# Patient Record
Sex: FEMALE | Race: WHITE | Employment: FULL TIME | URBAN - METROPOLITAN AREA
[De-identification: names, ages, dates, MRNs, and addresses within clinical notes are randomized per-mention and may not be internally consistent; named-entity substitution may affect disease eponyms.]

---

## 2017-03-30 RX ORDER — LISINOPRIL 20 MG/1
TABLET ORAL
Qty: 90 TAB | Refills: 3 | Status: SHIPPED | OUTPATIENT
Start: 2017-03-30 | End: 2019-01-31 | Stop reason: SINTOL

## 2017-03-30 RX ORDER — LISINOPRIL 20 MG/1
20 TABLET ORAL DAILY
Qty: 90 TAB | Refills: 2 | Status: SHIPPED | OUTPATIENT
Start: 2017-03-30 | End: 2018-04-10 | Stop reason: SDUPTHER

## 2017-03-30 RX ORDER — PRAVASTATIN SODIUM 10 MG/1
TABLET ORAL
Qty: 90 TAB | Refills: 3 | Status: SHIPPED | OUTPATIENT
Start: 2017-03-30 | End: 2018-04-10 | Stop reason: SDUPTHER

## 2017-03-30 NOTE — TELEPHONE ENCOUNTER
Requested Prescriptions     Pending Prescriptions Disp Refills    lisinopril (PRINIVIL, ZESTRIL) 20 mg tablet 90 Tab 1    pravastatin (PRAVACHOL) 10 mg tablet 90 Tab 1   Last OV-1/12/15  Next OV-N/S  Med refilled-12/25/16

## 2017-03-30 NOTE — TELEPHONE ENCOUNTER
Requested Prescriptions     Pending Prescriptions Disp Refills    lisinopril (PRINIVIL, ZESTRIL) 20 mg tablet 90 Tab 2     Sig: Take 1 Tab by mouth daily.    Last OV-1/12/15  Next OV-N/S  Med refilled-12/25/16

## 2017-08-30 ENCOUNTER — TELEPHONE (OUTPATIENT)
Dept: INTERNAL MEDICINE CLINIC | Age: 57
End: 2017-08-30

## 2017-08-30 NOTE — TELEPHONE ENCOUNTER
Dr. Grover Hammans Patient last seen Jan. 12, 2015 states she needs a call back to discuss Left knee pain & aching that is usually when she does any weight bearing movement. Patient states she needs to know if needs to be seen by Dr. Grover Hammans first or if he can refer to specialist. Please call to advise.  Thank you

## 2017-08-30 NOTE — TELEPHONE ENCOUNTER
Called, spoke to pt. Two pt identifiers confirmed. Pt informed Dr. Reinaldo Samson will be notified. Pt verbalized understanding of information discussed w/ no further questions at this time.

## 2017-08-30 NOTE — TELEPHONE ENCOUNTER
MD Nicholas Stanley, LPN        Caller: Unspecified (Today,  8:39 AM)                     [facility]

## 2017-08-30 NOTE — TELEPHONE ENCOUNTER
Called, spoke to pt. Two pt identifiers confirmed. Pt referred to Dr. Warden Sparrow. Given contact info. Pt verbalized understanding of information discussed w/ no further questions at this time.

## 2018-04-06 ENCOUNTER — TELEPHONE (OUTPATIENT)
Dept: INTERNAL MEDICINE CLINIC | Age: 58
End: 2018-04-06

## 2018-04-06 NOTE — TELEPHONE ENCOUNTER
Today our office received a refill request from [address] home delivery for refill on prinivil 20 mg and [id number] mg. Pt has seen in office by Dr Percy Weller on 1/12/15. I called pt, no answer- left voice message that she is overdue for follow up appointment and to return call to office to schedule an appointment. Message will be routed to Dr Percy Weller as an [id number] [address].

## 2018-04-09 NOTE — TELEPHONE ENCOUNTER
Pt wants to come in on 04/17/18 but no appt was avail.  Best contact number [phone].          Message received & copied from [facility] after closing on 4/6/18

## 2018-04-10 RX ORDER — LISINOPRIL 20 MG/1
20 TABLET ORAL DAILY
Qty: 90 TAB | Refills: 0 | Status: SHIPPED | OUTPATIENT
Start: 2018-04-10 | End: 2018-04-11 | Stop reason: SDUPTHER

## 2018-04-10 RX ORDER — PRAVASTATIN SODIUM 10 MG/1
TABLET ORAL
Qty: 90 TAB | Refills: 0 | Status: SHIPPED | OUTPATIENT
Start: 2018-04-10 | End: 2018-04-11 | Stop reason: SDUPTHER

## 2018-04-10 NOTE — TELEPHONE ENCOUNTER
Patient states she's returning your call. Patient is requesting to get an appt to [facility] with Dr. Laguna Handing on tomorrow, 4/11/18. Patient last seen January 12, 2015 & needs medicatio  Refills done. Please call to advise.  Thank you

## 2018-04-10 NOTE — TELEPHONE ENCOUNTER
Spoke with patient. Two pt identifiers confirmed. Patient states that she is trying to get an appointment with Dr. Will Fournier sooner than August.  Advised patient that due to the length of time that has passed since her last OV, she would be considered a NP. Advised patient that the first appointment available for a NP is not until August.  Offered to schedule patient with another provider. Patient declined, states that she only wants to see Dr. Will Fournier. Patient states that she is ok with the August appointment but that she needs refills on her prescriptions to last until she can be seen. Advised patient that I will pend her refills to Dr. Will Fournier for approval and will let him know about the situation with her appointment. Pt verbalized understanding of information discussed w/ no further questions at this time.

## 2018-04-11 DIAGNOSIS — I10 ESSENTIAL HYPERTENSION: Primary | ICD-10-CM

## 2018-04-11 DIAGNOSIS — E78.5 DYSLIPIDEMIA: Primary | ICD-10-CM

## 2018-04-11 RX ORDER — PRAVASTATIN SODIUM 10 MG/1
TABLET ORAL
Qty: 90 TAB | Refills: 0 | Status: SHIPPED | OUTPATIENT
Start: 2018-04-11 | End: 2018-07-10 | Stop reason: SDUPTHER

## 2018-04-11 RX ORDER — LISINOPRIL 20 MG/1
20 TABLET ORAL DAILY
Qty: 90 TAB | Refills: 0 | Status: SHIPPED | OUTPATIENT
Start: 2018-04-11 | End: 2018-10-19 | Stop reason: SDUPTHER

## 2018-04-11 NOTE — TELEPHONE ENCOUNTER
Requested Prescriptions     Pending Prescriptions Disp Refills    lisinopril (PRINIVIL, ZESTRIL) 20 mg tablet 90 Tab 0     Sig: Take 1 Tab by mouth daily. PCP: Queta Barros MD    Last appt: Visit date not found  Future Appointments  Date Time Provider Héctor Lagunas   8/3/2018 11:00 AM Queta Barros MD [address]       Requested Prescriptions     Pending Prescriptions Disp Refills    lisinopril (PRINIVIL, ZESTRIL) 20 mg tablet 90 Tab 0     Sig: Take 1 Tab by mouth daily.

## 2018-04-11 NOTE — TELEPHONE ENCOUNTER
Requested Prescriptions     Pending Prescriptions Disp Refills    pravastatin (PRAVACHOL) 10 mg tablet 90 Tab 0     Sig: TAKE 1 TABLET NIGHTLY     PCP: Samanta Peterson MD    Last appt: Visit date not found  Future Appointments  Date Time Provider Héctor Janneth   8/3/2018 11:00 AM [facility]       Requested Prescriptions     Pending Prescriptions Disp Refills    pravastatin (PRAVACHOL) 10 mg tablet 90 Tab 0     Sig: TAKE 1 TABLET NIGHTLY

## 2018-07-10 DIAGNOSIS — E78.5 DYSLIPIDEMIA: ICD-10-CM

## 2018-07-10 RX ORDER — LISINOPRIL 20 MG/1
TABLET ORAL
Qty: 90 TAB | Refills: 3 | Status: SHIPPED | OUTPATIENT
Start: 2018-07-10 | End: 2018-10-19 | Stop reason: SDUPTHER

## 2018-07-10 RX ORDER — PRAVASTATIN SODIUM 10 MG/1
TABLET ORAL
Qty: 90 TAB | Refills: 3 | Status: SHIPPED | OUTPATIENT
Start: 2018-07-10 | End: 2018-11-26

## 2018-10-19 ENCOUNTER — OFFICE VISIT (OUTPATIENT)
Dept: INTERNAL MEDICINE CLINIC | Age: 58
End: 2018-10-19

## 2018-10-19 VITALS
BODY MASS INDEX: 22.38 KG/M2 | HEIGHT: 60 IN | SYSTOLIC BLOOD PRESSURE: 117 MMHG | WEIGHT: 114 LBS | HEART RATE: 102 BPM | TEMPERATURE: 98.2 F | DIASTOLIC BLOOD PRESSURE: 74 MMHG | OXYGEN SATURATION: 100 %

## 2018-10-19 DIAGNOSIS — Q38.3 TONGUE ABNORMALITY: ICD-10-CM

## 2018-10-19 DIAGNOSIS — J01.91 ACUTE RECURRENT SINUSITIS, UNSPECIFIED LOCATION: ICD-10-CM

## 2018-10-19 DIAGNOSIS — Z11.59 ENCOUNTER FOR HEPATITIS C SCREENING TEST FOR LOW RISK PATIENT: ICD-10-CM

## 2018-10-19 DIAGNOSIS — E78.00 PURE HYPERCHOLESTEROLEMIA: ICD-10-CM

## 2018-10-19 DIAGNOSIS — I10 ESSENTIAL HYPERTENSION: Primary | ICD-10-CM

## 2018-10-19 PROBLEM — F17.210 CIGARETTE SMOKER: Status: ACTIVE | Noted: 2018-10-19

## 2018-10-19 RX ORDER — AZITHROMYCIN 250 MG/1
250 TABLET, FILM COATED ORAL SEE ADMIN INSTRUCTIONS
Qty: 6 TAB | Refills: 0 | Status: SHIPPED | OUTPATIENT
Start: 2018-10-19 | End: 2018-10-24

## 2018-10-19 NOTE — PROGRESS NOTES
HISTORY OF PRESENT ILLNESS Geovanna Cheng is a 62 y.o. female. HPI Pt here to reestablish care Hx HTN HLD osteoporosis Had recent labs at work   HDL 38 LDl 151 tg 85 Smokes 1 pack every 3-4 d Increased sinus congestion and cough 4 weeks and again now No fc, wheezes Had colonsocopy 2015--no polyps--FH polyps Sees gyn MD yearly-Dr Andria Pedraza. dexa per Dr Andria Pedraza Last OV ( 2015) Patient here today for followup for hypertension and hyperlipidemia. She had recent admission screening tests done at work. Cholesterol was 233  glucose 80. Blood pressure reading was normal. She generally eats very healthy diet. She stays active but does not really exercise on a regular basis. She resumed smoking less than half pack per day. She cannot tolerate Chantix. Patient Active Problem List  
 Diagnosis Date Noted  Osteoporosis 07/25/2016  Essential hypertension, benign 03/24/2010  Pure hypercholesterolemia 03/24/2010  Kidney stones 03/24/2010 Current Outpatient Medications Medication Sig Dispense Refill  lisinopril (PRINIVIL, ZESTRIL) 20 mg tablet TAKE 1 TABLET BY MOUTH DAILY 90 Tab 3  pravastatin (PRAVACHOL) 10 mg tablet TAKE 1 TABLET BY MOUTH NIGHTLY 90 Tab 3  
 lisinopril (PRINIVIL, ZESTRIL) 20 mg tablet Take 1 Tab by mouth daily. 90 Tab 0  
 lisinopril (PRINIVIL, ZESTRIL) 20 mg tablet TAKE 1 TABLET DAILY 90 Tab 3 Allergies Allergen Reactions  Contrast Dye [Iodine] Hives  Pcn [Penicillins] Hives No past medical history on file. Past Surgical History:  
Procedure Laterality Date  HX HEMORRHOIDECTOMY  HX ORTHOPAEDIC    
 bone bx Family History Problem Relation Age of Onset  Elevated Lipids Mother  Hypertension Mother  Stroke Mother  Heart Disease Father  Hypertension Father Social History Tobacco Use  Smoking status: Former Smoker Packs/day: 1.00 Years: 35.00   Pack years: 35.00  
 Types: Cigarettes Last attempt to quit: 1/21/2008 Years since quitting: 10.7  Smokeless tobacco: Never Used Substance Use Topics  Alcohol use: Yes Comment: occasional  
  
Lab Results Component Value Date/Time WBC 6.3 02/10/2012 08:43 AM  
 HGB 14.9 02/10/2012 08:43 AM  
 HCT 46.0 (H) 02/10/2012 08:43 AM  
 PLATELET 189 37/82/0566 08:43 AM  
 MCV 98 02/10/2012 08:43 AM  
 
Lab Results Component Value Date/Time Glucose 94 06/23/2014 09:24 AM  
 LDL, calculated 143 (H) 06/23/2014 09:24 AM  
 Creatinine 0.81 06/23/2014 09:24 AM  
  
Lab Results Component Value Date/Time Cholesterol, total 222 (H) 06/23/2014 09:24 AM  
 HDL Cholesterol 52 06/23/2014 09:24 AM  
 LDL, calculated 143 (H) 06/23/2014 09:24 AM  
 Triglyceride 135 06/23/2014 09:24 AM  
 CHOL/HDL Ratio 5.2 (H) 11/06/2009 08:38 AM  
 
Lab Results Component Value Date/Time GFR est non-AA 83 06/23/2014 09:24 AM  
 GFR est AA 96 06/23/2014 09:24 AM  
 Creatinine 0.81 06/23/2014 09:24 AM  
 BUN 9 06/23/2014 09:24 AM  
 Sodium 142 06/23/2014 09:24 AM  
 Potassium 4.7 06/23/2014 09:24 AM  
 Chloride 103 06/23/2014 09:24 AM  
 CO2 24 06/23/2014 09:24 AM  
  
Review of Systems Constitutional: Negative for chills, fever, malaise/fatigue and weight loss. HENT: Positive for congestion and sinus pain. Eyes: Negative for blurred vision and double vision. Respiratory: Negative for cough and shortness of breath. Cardiovascular: Negative for chest pain and palpitations. Gastrointestinal: Negative for abdominal pain, blood in stool, constipation, diarrhea, melena, nausea and vomiting. Genitourinary: Negative for dysuria, frequency, hematuria and urgency. Musculoskeletal: Negative for back pain, falls, joint pain and myalgias. Neurological: Negative for dizziness, tremors and headaches. Physical Exam  
Constitutional: She appears well-developed and well-nourished. Appears stated age HENT:  
 Mouth/Throat: Oropharynx is clear and moist.  
Yellow/brown discoloration of the tongue, no ulceration Eyes: Pupils are equal, round, and reactive to light. Neck: Normal range of motion. Neck supple. No JVD present. No tracheal deviation present. No thyromegaly present. Cardiovascular: Normal rate, regular rhythm, normal heart sounds and intact distal pulses. Exam reveals no gallop and no friction rub. No murmur heard. Pulmonary/Chest: Effort normal and breath sounds normal. No respiratory distress. She has no wheezes. Abdominal: Soft. Bowel sounds are normal. She exhibits no distension and no mass. There is no tenderness. There is no rebound and no guarding. Musculoskeletal: She exhibits no edema. Lymphadenopathy:  
  She has no cervical adenopathy. Neurological: She is alert. Psychiatric: She has a normal mood and affect. Nursing note and vitals reviewed. ASSESSMENT and PLAN Diagnoses and all orders for this visit: 
 
Essential hypertension -     METABOLIC PANEL, COMPREHENSIVE 
-     CBC W/O DIFF 
-     LIPID PANEL Good control Pure hypercholesterolemia -     METABOLIC PANEL, COMPREHENSIVE 
-     TSH 3RD GENERATION Increase pravastatin 20 mgqd-check labs in 4-6 weeks Encounter for hepatitis C screening test for low risk patient 
-     HEPATITIS C AB Tongue abnormality 
-     REFERRAL TO ENT-OTOLARYNGOLOGY Advised tobacco cessation Acute recurrent sinusitis, unspecified location 
 zpak 
 otc decongestant Other orders 
-     azithromycin (ZITHROMAX) 250 mg tablet; Take 1 Tab by mouth See Admin Instructions for 5 days. Follow-up Disposition: 
Return in about 1 year (around 10/19/2019) for cpe.

## 2018-10-19 NOTE — PROGRESS NOTES
Chief Complaint Patient presents with [facility] Establish Care Reestablish care  Cold Symptoms

## 2018-11-23 ENCOUNTER — APPOINTMENT (OUTPATIENT)
Dept: INTERNAL MEDICINE CLINIC | Age: 58
End: 2018-11-23

## 2018-11-24 LAB
ALBUMIN SERPL-MCNC: 4.3 G/DL (ref 3.5–5.5)
ALBUMIN/GLOB SERPL: 2.4 {RATIO} (ref 1.2–2.2)
ALP SERPL-CCNC: 67 IU/L (ref 39–117)
ALT SERPL-CCNC: 24 IU/L (ref 0–32)
AST SERPL-CCNC: 18 IU/L (ref 0–40)
BILIRUB SERPL-MCNC: 0.5 MG/DL (ref 0–1.2)
BUN SERPL-MCNC: 15 MG/DL (ref 6–24)
BUN/CREAT SERPL: 18 (ref 9–23)
CALCIUM SERPL-MCNC: 9.2 MG/DL (ref 8.7–10.2)
CHLORIDE SERPL-SCNC: 105 MMOL/L (ref 96–106)
CHOLEST SERPL-MCNC: 223 MG/DL (ref 100–199)
CO2 SERPL-SCNC: 24 MMOL/L (ref 20–29)
CREAT SERPL-MCNC: 0.85 MG/DL (ref 0.57–1)
ERYTHROCYTE [DISTWIDTH] IN BLOOD BY AUTOMATED COUNT: 13.4 % (ref 12.3–15.4)
GLOBULIN SER CALC-MCNC: 1.8 G/DL (ref 1.5–4.5)
GLUCOSE SERPL-MCNC: 91 MG/DL (ref 65–99)
HCT VFR BLD AUTO: 42.9 % (ref 34–46.6)
HCV AB S/CO SERPL IA: <0.1 S/CO RATIO (ref 0–0.9)
HDLC SERPL-MCNC: 50 MG/DL
HGB BLD-MCNC: 14 G/DL (ref 11.1–15.9)
LDLC SERPL CALC-MCNC: 152 MG/DL (ref 0–99)
MCH RBC QN AUTO: 31.5 PG (ref 26.6–33)
MCHC RBC AUTO-ENTMCNC: 32.6 G/DL (ref 31.5–35.7)
MCV RBC AUTO: 96 FL (ref 79–97)
PLATELET # BLD AUTO: 276 X10E3/UL (ref 150–379)
POTASSIUM SERPL-SCNC: 4.1 MMOL/L (ref 3.5–5.2)
PROT SERPL-MCNC: 6.1 G/DL (ref 6–8.5)
RBC # BLD AUTO: 4.45 X10E6/UL (ref 3.77–5.28)
SODIUM SERPL-SCNC: 143 MMOL/L (ref 134–144)
TRIGL SERPL-MCNC: 104 MG/DL (ref 0–149)
TSH SERPL DL<=0.005 MIU/L-ACNC: 1.12 UIU/ML (ref 0.45–4.5)
VLDLC SERPL CALC-MCNC: 21 MG/DL (ref 5–40)
WBC # BLD AUTO: 7.6 X10E3/UL (ref 3.4–10.8)

## 2018-11-26 DIAGNOSIS — E78.00 PURE HYPERCHOLESTEROLEMIA: Primary | ICD-10-CM

## 2018-11-26 RX ORDER — ATORVASTATIN CALCIUM 20 MG/1
20 TABLET, FILM COATED ORAL DAILY
Qty: 90 TAB | Refills: 1 | Status: SHIPPED | OUTPATIENT
Start: 2018-11-26 | End: 2019-04-11 | Stop reason: SDUPTHER

## 2019-01-31 ENCOUNTER — TELEPHONE (OUTPATIENT)
Dept: INTERNAL MEDICINE CLINIC | Age: 59
End: 2019-01-31

## 2019-01-31 RX ORDER — LOSARTAN POTASSIUM 50 MG/1
50 TABLET ORAL DAILY
Qty: 90 TAB | Refills: 3 | Status: SHIPPED | OUTPATIENT
Start: 2019-01-31 | End: 2019-02-01

## 2019-01-31 NOTE — TELEPHONE ENCOUNTER
#[phone] pt has a terrible, constant cough with chest tightness and sinus congestion. Pt would like advice as to what she can do today. Please call pt back yet today. Thanks.

## 2019-01-31 NOTE — TELEPHONE ENCOUNTER
Called, spoke to pt. Two identifiers confirmed. Pt stated she saw Dr. Dragan Segal and was prescribed a Z-Easton for a sinus infection. Pt stated that for the past couple months she has been battling a cough. Pt stated she is coughing so much her chest feels tight. Pt also said she has a constant 'tickle' in her throat making it hard for her to get full sentences out. Pt has been using OTC robitussin with no relief. Notified pt I would message to Dr. Dragan Segal and call her back with his recommendations. Pt verbalized understanding of information discussed w/ no further questions at this time.

## 2019-01-31 NOTE — TELEPHONE ENCOUNTER
Bautista Lamar MD   You 32 minutes ago (11:34 AM)     She should see me in 4 weeks for f/u cough and BP. (Routing comment)         Bautista Lamar MD   You 33 minutes ago (11:33 AM)     Stop lisinopril (Routing comment)         Bautista Lamar MD   You 35 minutes ago (11:31 AM)     Tell pt it may be due to lilsinopri. Will change it to losartan 50 mg every day and please call in rola ac 5 cc tid prn cough (Routing comment)          Called, spoke to pt. Two identifiers confirmed. Notified pt of Dr. Cydney Young recommendation and new medications. Cough med called into pharmacy. Pt verbalized understanding of information discussed w/ no further questions at this time. Appointment scheduled for 2/18 for follow up.

## 2019-01-31 NOTE — TELEPHONE ENCOUNTER
#[id number] pt states she thought she had a cough & sinus's but as the day goes on she is getting worse. Throat is sore, freezing cold, body aches and fever of 100.6  What does Dr. Percy Weller want to do? See pt?

## 2019-01-31 NOTE — TELEPHONE ENCOUNTER
Spoke with patient after 2 patient identifiers being note and advised of appt on Friday, February 1, 2019 09:30 AM, patient accpeted. Patient expressed understanding and has no further questions at this time.

## 2019-02-01 ENCOUNTER — OFFICE VISIT (OUTPATIENT)
Dept: INTERNAL MEDICINE CLINIC | Age: 59
End: 2019-02-01

## 2019-02-01 VITALS
HEART RATE: 100 BPM | HEIGHT: 60 IN | SYSTOLIC BLOOD PRESSURE: 114 MMHG | OXYGEN SATURATION: 99 % | BODY MASS INDEX: 21.79 KG/M2 | TEMPERATURE: 98.4 F | DIASTOLIC BLOOD PRESSURE: 71 MMHG | WEIGHT: 111 LBS

## 2019-02-01 DIAGNOSIS — R50.9 HYPERTHERMIA-INDUCED DEFECT: Primary | ICD-10-CM

## 2019-02-01 DIAGNOSIS — R05.9 COUGH: ICD-10-CM

## 2019-02-01 DIAGNOSIS — R50.9 FEVER, UNSPECIFIED FEVER CAUSE: ICD-10-CM

## 2019-02-01 DIAGNOSIS — R52 BODY ACHES: Primary | ICD-10-CM

## 2019-02-01 LAB
QUICKVUE INFLUENZA TEST: NEGATIVE
VALID INTERNAL CONTROL?: YES

## 2019-02-01 RX ORDER — LISINOPRIL 20 MG/1
20 TABLET ORAL DAILY
Qty: 90 TAB | Refills: 3 | Status: SHIPPED | OUTPATIENT
Start: 2019-02-01 | End: 2019-09-26 | Stop reason: SDUPTHER

## 2019-02-01 RX ORDER — AZITHROMYCIN 250 MG/1
250 TABLET, FILM COATED ORAL SEE ADMIN INSTRUCTIONS
Qty: 6 TAB | Refills: 0 | Status: SHIPPED | OUTPATIENT
Start: 2019-02-01 | End: 2019-02-06

## 2019-02-01 RX ORDER — OSELTAMIVIR PHOSPHATE 75 MG/1
75 CAPSULE ORAL 2 TIMES DAILY
Qty: 10 CAP | Refills: 0 | Status: SHIPPED | OUTPATIENT
Start: 2019-02-01 | End: 2019-02-06

## 2019-02-01 NOTE — PROGRESS NOTES
HISTORY OF PRESENT ILLNESS  Easton Lemon is a 62 y.o. female. HPI     C/o cough with ticking in throat x1-2   Last night with fever to 100.9, achiness last night  Feels tight in chest x 1 week all week all day long  Smoker 1ppd in the past cut back to 1 pal/week  No wheezes  Slight productive cough  Patient Active Problem List    Diagnosis Date Noted    Cigarette smoker 10/19/2018    Osteoporosis 07/25/2016    Essential hypertension, benign 03/24/2010    Pure hypercholesterolemia 03/24/2010    Kidney stones 03/24/2010     Current Outpatient Medications   Medication Sig Dispense Refill    azithromycin (ZITHROMAX) 250 mg tablet Take 1 Tab by mouth See Admin Instructions for 5 days. 6 Tab 0    oseltamivir (TAMIFLU) 75 mg capsule Take 1 Cap by mouth two (2) times a day for 5 days. 10 Cap 0    lisinopril (PRINIVIL, ZESTRIL) 20 mg tablet Take 1 Tab by mouth daily. 90 Tab 3    atorvastatin (LIPITOR) 20 mg tablet Take 1 Tab by mouth daily. 90 Tab 1     Allergies   Allergen Reactions    Contrast Dye [Iodine] Hives    Pcn [Penicillins] Hives           ROS    Physical Exam   Constitutional: She appears well-developed and well-nourished. Appears stated age   Cardiovascular: Normal rate, regular rhythm and normal heart sounds. Exam reveals no friction rub. No murmur heard. Pulmonary/Chest: Effort normal and breath sounds normal. No respiratory distress. She has no wheezes. She has no rales. She exhibits no tenderness. Abdominal: Soft. Bowel sounds are normal.   Musculoskeletal: She exhibits no edema. Neurological: She is alert. Psychiatric: She has a normal mood and affect. Nursing note and vitals reviewed. ASSESSMENT and PLAN  Diagnoses and all orders for this visit:    1. Body aches  -     AMB POC RAPID INFLUENZA TEST-negative    2. Fever, unspecified fever cause  -     AMB POC RAPID INFLUENZA TEST-neg but c/w flu , will prescribe tamiflu and zpak  -     XR CHEST PA LAT; Future    3. Cough  -     XR CHEST PA LAT; Future    4. HTN   Restart lisinopril --stop losartan  Other orders  -     azithromycin (ZITHROMAX) 250 mg tablet; Take 1 Tab by mouth See Admin Instructions for 5 days. -     oseltamivir (TAMIFLU) 75 mg capsule; Take 1 Cap by mouth two (2) times a day for 5 days. -     lisinopril (PRINIVIL, ZESTRIL) 20 mg tablet; Take 1 Tab by mouth daily.       Follow-up Disposition: Not on File

## 2019-04-11 RX ORDER — ATORVASTATIN CALCIUM 20 MG/1
TABLET, FILM COATED ORAL
Qty: 90 TAB | Refills: 3 | Status: SHIPPED | OUTPATIENT
Start: 2019-04-11 | End: 2020-05-20 | Stop reason: SDUPTHER

## 2019-09-26 RX ORDER — LISINOPRIL 20 MG/1
TABLET ORAL
Qty: 90 TAB | Refills: 3 | Status: SHIPPED | OUTPATIENT
Start: 2019-09-26 | End: 2020-02-21 | Stop reason: SDUPTHER

## 2019-10-24 ENCOUNTER — OFFICE VISIT (OUTPATIENT)
Dept: INTERNAL MEDICINE CLINIC | Age: 59
End: 2019-10-24

## 2019-10-24 VITALS
DIASTOLIC BLOOD PRESSURE: 71 MMHG | HEIGHT: 60 IN | OXYGEN SATURATION: 100 % | SYSTOLIC BLOOD PRESSURE: 111 MMHG | BODY MASS INDEX: 21.79 KG/M2 | WEIGHT: 111 LBS | RESPIRATION RATE: 16 BRPM | HEART RATE: 84 BPM | TEMPERATURE: 98.4 F

## 2019-10-24 DIAGNOSIS — H11.31 SUBCONJUNCTIVAL HEMORRHAGE OF RIGHT EYE: ICD-10-CM

## 2019-10-24 DIAGNOSIS — E78.00 PURE HYPERCHOLESTEROLEMIA: ICD-10-CM

## 2019-10-24 DIAGNOSIS — Z00.00 ROUTINE GENERAL MEDICAL EXAMINATION AT A HEALTH CARE FACILITY: Primary | ICD-10-CM

## 2019-10-24 DIAGNOSIS — I10 ESSENTIAL HYPERTENSION: ICD-10-CM

## 2019-10-24 NOTE — PATIENT INSTRUCTIONS
Office Policies    Phone calls/patient messages:            Please allow up to 24 hours for someone in the office to contact you about your call or message. Be mindful your provider may be out of the office or your message may require further review. We encourage you to use [employer] for your messages as this is a faster, more efficient way to communicate with our office                         Medication Refills:            Prescription medications require 48-72 business hours to process. We encourage you to use [employer] for your refills. For controlled medications: Please allow 72 business hours to process. Certain medications may require you to  a written prescription at our office. NO narcotic/controlled medications will be prescribed after 4pm Monday through Friday or on weekends              Form/Paperwork Completion:            Please note a $25 fee may incur for all paperwork for completed by our providers. We ask that you allow 7-10 business days. Pre-payment is due prior to picking up/faxing the completed form. You may also download your forms to [employer] to have your doctor print off.

## 2019-10-24 NOTE — PROGRESS NOTES
HISTORY OF PRESENT ILLNESS  Vahid Roberson is a 61 y.o. female. HPI   Here for CPE and f/u HTN HLD smoker  Had recent dexa and well woman exam per gyn MD-pt reports no hx of osteoporosis on prior dexa  1 pk q4d   Feels well overall  Reports med compliance  Woke up this week with right subconjunctival hemorrhage-asymptomatic   last OV  Pt here to reestablish care  Hx HTN HLD osteoporosis  Had recent labs at work   HDL 38 LDl 151 tg 85  Smokes 1 pack every 3-4 d  Increased sinus congestion and cough 4 weeks and again now  No fc, wheezes     Had colonsocopy 2015--no polyps--FH polyps  Sees gyn MD yearly-Dr El Slaughter.  dexa per Dr El Slaughter       Patient Active Problem List    Diagnosis Date Noted    Cigarette smoker 10/19/2018    Osteoporosis 07/25/2016    Essential hypertension, benign 03/24/2010    Pure hypercholesterolemia 03/24/2010    Kidney stones 03/24/2010     Current Outpatient Medications   Medication Sig Dispense Refill    lisinopril (PRINIVIL, ZESTRIL) 20 mg tablet TAKE 1 TABLET BY MOUTH DAILY 90 Tab 3    atorvastatin (LIPITOR) 20 mg tablet TAKE 1 TABLET BY MOUTH DAILY 90 Tab 3     Allergies   Allergen Reactions    Contrast Dye [Iodine] Hives    Pcn [Penicillins] Hives     Social History     Tobacco Use    Smoking status: Current Every Day Smoker     Packs/day: 1.00     Years: 35.00     Pack years: 35.00     Types: Cigarettes    Smokeless tobacco: Never Used   Substance Use Topics    Alcohol use: Yes     Frequency: 2-4 times a month     Drinks per session: 1 or 2     Binge frequency: Less than monthly     Comment: occasional      Lab Results   Component Value Date/Time    WBC 7.6 11/23/2018 08:54 AM    HGB 14.0 11/23/2018 08:54 AM    HCT 42.9 11/23/2018 08:54 AM    PLATELET 069 19/89/5496 08:54 AM    MCV 96 11/23/2018 08:54 AM     Lab Results   Component Value Date/Time    Glucose 91 11/23/2018 08:54 AM    LDL, calculated 152 (H) 11/23/2018 08:54 AM    Creatinine 0.85 11/23/2018 08:54 AM Lab Results   Component Value Date/Time    Cholesterol, total 223 (H) 11/23/2018 08:54 AM    HDL Cholesterol 50 11/23/2018 08:54 AM    LDL, calculated 152 (H) 11/23/2018 08:54 AM    Triglyceride 104 11/23/2018 08:54 AM    CHOL/HDL Ratio 5.2 (H) 11/06/2009 08:38 AM     Lab Results   Component Value Date/Time    Glucose 91 11/23/2018 08:54 AM         Review of Systems   Constitutional: Negative for chills, fever, malaise/fatigue and weight loss. Eyes: Negative for blurred vision and double vision. Respiratory: Negative for cough and shortness of breath. Cardiovascular: Negative for chest pain and palpitations. Gastrointestinal: Negative for abdominal pain, blood in stool, constipation, diarrhea, melena, nausea and vomiting. Genitourinary: Negative for dysuria, frequency, hematuria and urgency. Musculoskeletal: Negative for back pain, falls, joint pain and myalgias. Neurological: Negative for dizziness, tremors and headaches. Physical Exam   Constitutional: She appears well-developed and well-nourished. Appears stated age   Eyes:       Cardiovascular: Normal rate, regular rhythm and normal heart sounds. Exam reveals no gallop and no friction rub. No murmur heard. Pulmonary/Chest: Effort normal and breath sounds normal. No respiratory distress. She has no wheezes. Abdominal: Soft. Bowel sounds are normal.   Musculoskeletal: She exhibits no edema. Neurological: She is alert. Psychiatric: She has a normal mood and affect. Nursing note and vitals reviewed. ASSESSMENT and PLAN  Diagnoses and all orders for this visit:    1. Routine general medical examination at a health care facility  -     CBC W/O DIFF  -     LIPID PANEL  -     METABOLIC PANEL, COMPREHENSIVE  -     TSH 3RD GENERATION   UTD well woman care   recommened tobacco cessation and completion of AMD   Declines vaccinations  2. Essential hypertension   Good control  3.  Pure hypercholesterolemia   Continue statin   Labs ordered  4. Subconjunctival hemorrhage of right eye   Reassurance   Warm compress    Follow-up and Dispositions    · Return in about 1 year (around 10/24/2020) for cpe.

## 2019-10-25 ENCOUNTER — TELEPHONE (OUTPATIENT)
Dept: INTERNAL MEDICINE CLINIC | Age: 59
End: 2019-10-25

## 2019-10-25 DIAGNOSIS — M81.8 OTHER OSTEOPOROSIS, UNSPECIFIED PATHOLOGICAL FRACTURE PRESENCE: Primary | ICD-10-CM

## 2019-10-25 RX ORDER — ALENDRONATE SODIUM 70 MG/1
70 TABLET ORAL
Qty: 12 TAB | Refills: 3 | Status: SHIPPED | OUTPATIENT
Start: 2019-10-25 | End: 2020-03-05

## 2019-10-25 NOTE — TELEPHONE ENCOUNTER
Patient return call     Caller's first and last name and relationship (if not the patient):       Best contact number(s):     [phone]   Whose call is being returned:     Dandre Abarca   Details to clarify the request:       Agustin Hernandez / Kirk Turcios

## 2019-10-25 NOTE — TELEPHONE ENCOUNTER
Called, spoke to pt. Two identifiers confirmed. Notified pt of bone density results per Dr. Charis Noyola. Pt verbalized understanding of information discussed w/ no further questions at this time. Fosamax 70mg sent to pt's pharmacy per verbal order from Dr. Charis Noyola.

## 2020-02-15 LAB
ALBUMIN SERPL-MCNC: 4.1 G/DL (ref 3.8–4.9)
ALBUMIN/GLOB SERPL: 2 {RATIO} (ref 1.2–2.2)
ALP SERPL-CCNC: 67 IU/L (ref 39–117)
ALT SERPL-CCNC: 21 IU/L (ref 0–32)
AST SERPL-CCNC: 19 IU/L (ref 0–40)
BILIRUB SERPL-MCNC: 0.6 MG/DL (ref 0–1.2)
BUN SERPL-MCNC: 11 MG/DL (ref 6–24)
BUN/CREAT SERPL: 14 (ref 9–23)
CALCIUM SERPL-MCNC: 9.8 MG/DL (ref 8.7–10.2)
CHLORIDE SERPL-SCNC: 104 MMOL/L (ref 96–106)
CHOLEST SERPL-MCNC: 170 MG/DL (ref 100–199)
CO2 SERPL-SCNC: 24 MMOL/L (ref 20–29)
CREAT SERPL-MCNC: 0.78 MG/DL (ref 0.57–1)
ERYTHROCYTE [DISTWIDTH] IN BLOOD BY AUTOMATED COUNT: 12.3 % (ref 11.7–15.4)
GLOBULIN SER CALC-MCNC: 2.1 G/DL (ref 1.5–4.5)
GLUCOSE SERPL-MCNC: 87 MG/DL (ref 65–99)
HCT VFR BLD AUTO: 43.6 % (ref 34–46.6)
HDLC SERPL-MCNC: 63 MG/DL
HGB BLD-MCNC: 14.8 G/DL (ref 11.1–15.9)
LDLC SERPL CALC-MCNC: 89 MG/DL (ref 0–99)
MCH RBC QN AUTO: 32.8 PG (ref 26.6–33)
MCHC RBC AUTO-ENTMCNC: 33.9 G/DL (ref 31.5–35.7)
MCV RBC AUTO: 97 FL (ref 79–97)
PLATELET # BLD AUTO: 263 X10E3/UL (ref 150–450)
POTASSIUM SERPL-SCNC: 4.6 MMOL/L (ref 3.5–5.2)
PROT SERPL-MCNC: 6.2 G/DL (ref 6–8.5)
RBC # BLD AUTO: 4.51 X10E6/UL (ref 3.77–5.28)
SODIUM SERPL-SCNC: 143 MMOL/L (ref 134–144)
TRIGL SERPL-MCNC: 91 MG/DL (ref 0–149)
TSH SERPL DL<=0.005 MIU/L-ACNC: 0.98 UIU/ML (ref 0.45–4.5)
VLDLC SERPL CALC-MCNC: 18 MG/DL (ref 5–40)
WBC # BLD AUTO: 7.3 X10E3/UL (ref 3.4–10.8)

## 2020-02-21 RX ORDER — LISINOPRIL 20 MG/1
TABLET ORAL
Qty: 90 TAB | Refills: 3 | Status: SHIPPED | OUTPATIENT
Start: 2020-02-21 | End: 2021-01-28

## 2020-02-21 NOTE — TELEPHONE ENCOUNTER
PCP: Dinesh oLuis MD    Last appt: 2/14/2020  No future appointments.     Requested Prescriptions     Pending Prescriptions Disp Refills    lisinopril (PRINIVIL, ZESTRIL) 20 mg tablet 90 Tab 3

## 2020-02-25 ENCOUNTER — OFFICE VISIT (OUTPATIENT)
Dept: INTERNAL MEDICINE CLINIC | Age: 60
End: 2020-02-25

## 2020-02-25 ENCOUNTER — TELEPHONE (OUTPATIENT)
Dept: INTERNAL MEDICINE CLINIC | Age: 60
End: 2020-02-25

## 2020-02-25 VITALS
SYSTOLIC BLOOD PRESSURE: 98 MMHG | HEIGHT: 60 IN | RESPIRATION RATE: 16 BRPM | OXYGEN SATURATION: 98 % | TEMPERATURE: 98.2 F | BODY MASS INDEX: 22.58 KG/M2 | HEART RATE: 93 BPM | WEIGHT: 115 LBS | DIASTOLIC BLOOD PRESSURE: 62 MMHG

## 2020-02-25 DIAGNOSIS — R05.9 COUGH: ICD-10-CM

## 2020-02-25 DIAGNOSIS — J01.91 ACUTE RECURRENT SINUSITIS, UNSPECIFIED LOCATION: ICD-10-CM

## 2020-02-25 DIAGNOSIS — M81.0 OSTEOPOROSIS, UNSPECIFIED OSTEOPOROSIS TYPE, UNSPECIFIED PATHOLOGICAL FRACTURE PRESENCE: Primary | ICD-10-CM

## 2020-02-25 RX ORDER — AZITHROMYCIN 250 MG/1
250 TABLET, FILM COATED ORAL SEE ADMIN INSTRUCTIONS
Qty: 6 TAB | Refills: 0 | Status: SHIPPED | OUTPATIENT
Start: 2020-02-25 | End: 2020-03-01

## 2020-02-25 RX ORDER — CODEINE PHOSPHATE AND GUAIFENESIN 10; 100 MG/5ML; MG/5ML
5 SOLUTION ORAL
Qty: 100 ML | Refills: 0 | Status: SHIPPED | OUTPATIENT
Start: 2020-02-25 | End: 2020-02-28

## 2020-02-25 NOTE — PATIENT INSTRUCTIONS
Office Policies    Phone calls/patient messages:            Please allow up to 24 hours for someone in the office to contact you about your call or message. Be mindful your provider may be out of the office or your message may require further review. We encourage you to use [email or web address] for your messages as this is a faster, more efficient way to communicate with our office                         Medication Refills:            Prescription medications require 48-72 business hours to process. We encourage you to use [email or web address] for your refills. For controlled medications: Please allow 72 business hours to process. Certain medications may require you to  a written prescription at our office. NO narcotic/controlled medications will be prescribed after 4pm Monday through Friday or on weekends              Form/Paperwork Completion:            Please note a $25 fee may incur for all paperwork for completed by our providers. We ask that you allow 7-10 business days. Pre-payment is due prior to picking up/faxing the completed form. You may also download your forms to [email or web address] to have your doctor print off.

## 2020-02-25 NOTE — TELEPHONE ENCOUNTER
Called, spoke to pt. Two identifiers confirmed. Appointment scheduled for today @ 215 with Dr. Olivia Mixon. Pt verbalized understanding of information discussed w/ no further questions at this time.

## 2020-02-25 NOTE — PROGRESS NOTES
HISTORY OF PRESENT ILLNESS  Rocky Israel is a 61 y.o. female. HPI   Here for acute care c/o sinus congestion x 2 weeks   Also with dry cough and post nasal drainage  Had chills but resolved    Pt reports not taking fosamax d/t concerns of GI side effects  Has osteoporosis of the hip T-2.7      Patient Active Problem List    Diagnosis Date Noted    Osteoporosis 02/25/2020    Cigarette smoker 10/19/2018    Essential hypertension, benign 03/24/2010    Pure hypercholesterolemia 03/24/2010    Kidney stones 03/24/2010     Current Outpatient Medications   Medication Sig Dispense Refill    azithromycin (ZITHROMAX) 250 mg tablet Take 1 Tab by mouth See Admin Instructions for 5 days. 6 Tab 0    guaiFENesin-codeine (ROBAFEN AC) 100-10 mg/5 mL solution Take 5 mL by mouth three (3) times daily as needed for Cough for up to 3 days. Max Daily Amount: 15 mL. 100 mL 0    lisinopril (PRINIVIL, ZESTRIL) 20 mg tablet TAKE 1 TABLET BY MOUTH DAILY 90 Tab 3    atorvastatin (LIPITOR) 20 mg tablet TAKE 1 TABLET BY MOUTH DAILY 90 Tab 3    alendronate (FOSAMAX) 70 mg tablet Take 1 Tab by mouth every seven (7) days.  12 Tab 3     Allergies   Allergen Reactions    Contrast Dye [Iodine] Hives    Pcn [Penicillins] Hives      Lab Results   Component Value Date/Time    WBC 7.3 02/14/2020 08:24 AM    HGB 14.8 02/14/2020 08:24 AM    HCT 43.6 02/14/2020 08:24 AM    PLATELET 021 49/38/6022 08:24 AM    MCV 97 02/14/2020 08:24 AM     Lab Results   Component Value Date/Time    Glucose 87 02/14/2020 08:24 AM    LDL, calculated 89 02/14/2020 08:24 AM    Creatinine 0.78 02/14/2020 08:24 AM      Lab Results   Component Value Date/Time    Cholesterol, total 170 02/14/2020 08:24 AM    HDL Cholesterol 63 02/14/2020 08:24 AM    LDL, calculated 89 02/14/2020 08:24 AM    Triglyceride 91 02/14/2020 08:24 AM    CHOL/HDL Ratio 5.2 (H) 11/06/2009 08:38 AM     Lab Results   Component Value Date/Time    GFR est non-AA 83 02/14/2020 08:24 AM    GFR est AA 96 02/14/2020 08:24 AM    Creatinine 0.78 02/14/2020 08:24 AM    BUN 11 02/14/2020 08:24 AM    Sodium 143 02/14/2020 08:24 AM    Potassium 4.6 02/14/2020 08:24 AM    Chloride 104 02/14/2020 08:24 AM    CO2 24 02/14/2020 08:24 AM        ROS    Physical Exam  Vitals signs and nursing note reviewed. Constitutional:       Appearance: She is well-developed. Comments: Appears stated age   HENT:      Mouth/Throat:      Mouth: Mucous membranes are moist.      Pharynx: Oropharynx is clear. Cardiovascular:      Rate and Rhythm: Normal rate and regular rhythm. Heart sounds: Normal heart sounds. No murmur. No friction rub. No gallop. Pulmonary:      Effort: Pulmonary effort is normal. No respiratory distress. Breath sounds: Normal breath sounds. No wheezing. Abdominal:      General: Bowel sounds are normal.      Palpations: Abdomen is soft. Neurological:      Mental Status: She is alert. ASSESSMENT and PLAN  Diagnoses and all orders for this visit:    1. Osteoporosis, unspecified osteoporosis type, unspecified pathological fracture presence  -     VITAMIN D, 25 HYDROXY   Start prolia injections--discussed medication with patient  2. Acute recurrent sinusitis, unspecified location   zpak  3. Cough  -     guaiFENesin-codeine (ROBAFEN AC) 100-10 mg/5 mL solution; Take 5 mL by mouth three (3) times daily as needed for Cough for up to 3 days. Max Daily Amount: 15 mL. Other orders  -     azithromycin (ZITHROMAX) 250 mg tablet; Take 1 Tab by mouth See Admin Instructions for 5 days. Follow-up and Dispositions    · Return in about 1 year (around 2/25/2021) for cpe.

## 2020-02-25 NOTE — TELEPHONE ENCOUNTER
Patient states she needs a call back in reference to getting an Acute Appt today with Dr. Lorraine De León or with someone else if Dr. Lorraine De León says it's ok for a Persistent Sinus Infection. Please call to discuss.  Thank you

## 2020-02-26 LAB — 25(OH)D3+25(OH)D2 SERPL-MCNC: 27.1 NG/ML (ref 30–100)

## 2020-02-26 NOTE — PROGRESS NOTES
Spoke with patient using two identifiers. Patient was informed recent labs and Dr. Roldan Cuff  Recommendations. Patient verbalized understanding.

## 2020-02-26 NOTE — PROGRESS NOTES
Tell pt her vit d level is slightly low . She has osteoporosis. Should take otc vit d 1000 iu daily.

## 2020-03-05 ENCOUNTER — OFFICE VISIT (OUTPATIENT)
Dept: INTERNAL MEDICINE CLINIC | Age: 60
End: 2020-03-05

## 2020-03-05 ENCOUNTER — TELEPHONE (OUTPATIENT)
Dept: INTERNAL MEDICINE CLINIC | Age: 60
End: 2020-03-05

## 2020-03-05 VITALS
SYSTOLIC BLOOD PRESSURE: 95 MMHG | DIASTOLIC BLOOD PRESSURE: 65 MMHG | HEART RATE: 86 BPM | HEIGHT: 60 IN | RESPIRATION RATE: 16 BRPM | WEIGHT: 112 LBS | BODY MASS INDEX: 21.99 KG/M2 | OXYGEN SATURATION: 99 % | TEMPERATURE: 98.1 F

## 2020-03-05 DIAGNOSIS — J40 BRONCHITIS: ICD-10-CM

## 2020-03-05 DIAGNOSIS — J02.9 ACUTE PHARYNGITIS, UNSPECIFIED ETIOLOGY: Primary | ICD-10-CM

## 2020-03-05 LAB
S PYO AG THROAT QL: NEGATIVE
VALID INTERNAL CONTROL?: YES

## 2020-03-05 RX ORDER — LEVOFLOXACIN 500 MG/1
500 TABLET, FILM COATED ORAL DAILY
Qty: 7 TAB | Refills: 0 | Status: SHIPPED | OUTPATIENT
Start: 2020-03-05 | End: 2020-06-19

## 2020-03-05 NOTE — TELEPHONE ENCOUNTER
#[phone]  Pt states she has a raw, sore throat with her tongue feeling raw and weird pt states. Pt needs to be seen today she states and will see any physician. Please call to schedule.

## 2020-03-05 NOTE — TELEPHONE ENCOUNTER
Jerilyn Osorio MD  You 54 minutes ago (11:48 AM)     Advise UC if not appts at this office    Routing comment      Called, spoke to pt. Two identifiers confirmed. Appointment scheduled for 3/6 @ [address] with Dr. You Oneal.   Pt verbalized understanding of information discussed w/ no further questions at this time.

## 2020-03-05 NOTE — PROGRESS NOTES
HISTORY OF PRESENT ILLNESS  Yeny Ortega is a 61 y.o. female. HPI     C/o burning sensation in back of tongue and throat last few days  completed zpak 4-5 d ago but still has some cough and phlegm  No f/c      Patient Active Problem List    Diagnosis Date Noted    Osteoporosis 02/25/2020    Cigarette smoker 10/19/2018    Essential hypertension, benign 03/24/2010    Pure hypercholesterolemia 03/24/2010    Kidney stones 03/24/2010     Current Outpatient Medications   Medication Sig Dispense Refill    lisinopril (PRINIVIL, ZESTRIL) 20 mg tablet TAKE 1 TABLET BY MOUTH DAILY 90 Tab 3    atorvastatin (LIPITOR) 20 mg tablet TAKE 1 TABLET BY MOUTH DAILY 90 Tab 3     Allergies   Allergen Reactions    Contrast Dye [Iodine] Hives    Pcn [Penicillins] Hives      Lab Results   Component Value Date/Time    GFR est non-AA 83 02/14/2020 08:24 AM    GFR est AA 96 02/14/2020 08:24 AM    Creatinine 0.78 02/14/2020 08:24 AM    BUN 11 02/14/2020 08:24 AM    Sodium 143 02/14/2020 08:24 AM    Potassium 4.6 02/14/2020 08:24 AM    Chloride 104 02/14/2020 08:24 AM    CO2 24 02/14/2020 08:24 AM        ROS    Physical Exam  Vitals signs and nursing note reviewed. Constitutional:       Appearance: She is well-developed. Comments: Appears stated age   HENT:      Mouth/Throat:      Comments: Small ulcer mid tongue right of midline 1 cm  Mild posterior pharynx erythema  No thrush  Cardiovascular:      Rate and Rhythm: Normal rate and regular rhythm. Heart sounds: Normal heart sounds. No murmur. No friction rub. No gallop. Pulmonary:      Effort: Pulmonary effort is normal. No respiratory distress. Breath sounds: Normal breath sounds. No wheezing or rales. Abdominal:      General: Bowel sounds are normal.      Palpations: Abdomen is soft. Neurological:      Mental Status: She is alert. ASSESSMENT and PLAN  Diagnoses and all orders for this visit:    1.  Acute pharyngitis, unspecified etiology  - AMB POC RAPID STREP A-negative   Symptomatic tx-cepacol chantel schuler    2. Bronchitis   levaquin x 7d    cxr if not resolved -discussed with pt  Other orders  -     levoFLOXacin (LEVAQUIN) 500 mg tablet; Take 1 Tab by mouth daily.

## 2020-03-05 NOTE — TELEPHONE ENCOUNTER
Called, spoke to pt. Two identifiers confirmed. Pt c/o a severe sore throat and a 'thick' substance on her tongue. Pt stated she finishes up her last dose of the zpack today and overall sinuses feel better. Pt stated she is having trouble swallowing d/t swelling in her throat. Notified pt I would send message to Dr. Bautista Valentin. Pt verbalized understanding of information discussed w/ no further questions at this time.

## 2020-03-05 NOTE — PATIENT INSTRUCTIONS
Office Policies    Phone calls/patient messages:            Please allow up to 24 hours for someone in the office to contact you about your call or message. Be mindful your provider may be out of the office or your message may require further review. We encourage you to use [employer] for your messages as this is a faster, more efficient way to communicate with our office                         Medication Refills:            Prescription medications require 48-72 business hours to process. We encourage you to use [employer] for your refills. For controlled medications: Please allow 72 business hours to process. Certain medications may require you to  a written prescription at our office. NO narcotic/controlled medications will be prescribed after 4pm Monday through Friday or on weekends              Form/Paperwork Completion:            Please note a $25 fee may incur for all paperwork for completed by our providers. We ask that you allow 7-10 business days. Pre-payment is due prior to picking up/faxing the completed form. You may also download your forms to [employer] to have your doctor print off.

## 2020-05-20 RX ORDER — ATORVASTATIN CALCIUM 20 MG/1
TABLET, FILM COATED ORAL
Qty: 90 TAB | Refills: 3 | Status: SHIPPED | OUTPATIENT
Start: 2020-05-20 | End: 2021-04-28

## 2020-05-20 NOTE — TELEPHONE ENCOUNTER
Patient needs refill approval today if possible as not rec'd by [facility] [address]. Patient is concerned she may run out now.  Thank you

## 2020-06-19 NOTE — PERIOP NOTES
[facility]  Ambulatory Surgery Unit  Pre-operative Instructions    Surgery/Procedure Date  6/26            Tentative Arrival Time tbd      1. On the day of your surgery/procedure, please report to the Ambulatory Surgery Unit Registration Desk and sign in at your designated time. The Ambulatory Surgery Unit is located in [facility] on the [facility] side of the [facility] across from the [address] [place] [address]. Please have all of your health insurance cards and a photo ID. 2. You must have someone with you to drive you home, as you should not drive a car for 24 hours following anesthesia. Please make arrangements for a responsible adult friend or family member to stay with you for at least the first 24 hours after your surgery. 3. Do not have anything to eat or drink (including water, gum, mints, coffee, juice) after 11:59 PM  6/25. This may not apply to medications prescribed by your physician. (Please note below the special instructions with medications to take the morning of surgery, if applicable.)    4. We recommend you do not drink any alcoholic beverages for 24 hours before and after your surgery. 5. Contact your surgeons office for instructions on the following medications: non-steroidal anti-inflammatory drugs (i.e. Advil, Aleve), vitamins, and supplements. (Some surgeons will want you to stop these medications prior to surgery and others may allow you to take them)   **If you are currently taking Plavix, Coumadin, Aspirin and/or other blood-thinning agents, contact your surgeon for instructions. ** Your surgeon will partner with the physician prescribing these medications to determine if it is safe to stop or if you need to continue taking. Please do not stop taking these medications without instructions from your surgeon.     6. In an effort to help prevent surgical site infection, we ask that you shower with an anti-bacterial soap (i.e. Dial/Safeguard, or the soap provided to you at your preadmission testing appointment) for 3 days prior to and on the morning of surgery, using a fresh towel after each shower. (Please begin this process with fresh bed linens.) Do not apply any lotions, powders, or deodorants after the shower on the day of your procedure. If applicable, please do not shave the operative site for 48 hours prior to surgery. 7. Wear comfortable clothes. Wear glasses instead of contacts. Do not bring any jewelry or money (other than copays or fees as instructed). Do not wear make-up, particularly mascara, the morning of your surgery. Do not wear nail polish, particularly if you are having foot /hand surgery. Wear your hair loose or down, no ponytails, buns, ronny pins or clips. All body piercings must be removed. 8. You should understand that if you do not follow these instructions your surgery may be cancelled. If your physical condition changes (i.e. fever, cold or flu) please contact your surgeon as soon as possible. 9. It is important that you be on time. If a situation occurs where you may be late, or if you have any questions or problems, please call [phone].    10. Your surgery time may be subject to change. You will receive a phone call the day prior to surgery to confirm your arrival time. Special Instructions: Take all medications and inhalers, as prescribed, on the morning of surgery with a sip of water EXCEPT: none      In an effort to improve the efficiency, privacy, and safety for all of our Pre-op patients visitors are not allowed in the Holding area. Once you arrive and are registered your family/visitors will be asked to remain in your vehicle. The Pre-op staff will call you when they are ready for you to enter the building and will explain to you and your family/visitors that the Pre-op phase is beginning.  At this time, if your procedure is outpatient, your family member will be asked to stay in their vehicle until the surgery is complete and you are ready for discharge. If you are going to be admitted after your surgery, once you are called to come inside the building, your family will be able to leave the parking lot. At this time the staff will also ask for your designated spokesperson information in the event that the physician or staff need to provide an update or obtain any pertinent information. The designated spokesperson will be notified if the physician needs to speak to family during the pre-operative phase.and/or in the post op phase. I understand a pre-operative phone call will be made to verify my surgery time. In the event that I am not available, I give permission for a message to be left on my answering service and/or with another person?       yes    Reviewed by phone with pt, verbalized understanding.     ___________________      ___________________      ________________  (Signature of Patient)          (Witness)                   (Date and Time)

## 2020-06-22 ENCOUNTER — HOSPITAL ENCOUNTER (OUTPATIENT)
Dept: PREADMISSION TESTING | Age: 60
Discharge: HOME | End: 2020-06-22
Attending: OTOLARYNGOLOGY
Payer: COMMERCIAL

## 2020-06-22 PROCEDURE — 87635 SARS-COV-2 COVID-19 AMP PRB: CPT

## 2020-06-23 LAB
SARS-COV-2, COV2NT: NOT DETECTED
SOURCE, COVRS: NORMAL
SPECIMEN SOURCE, FCOV2M: NORMAL

## 2020-06-25 ENCOUNTER — ANESTHESIA EVENT (OUTPATIENT)
Dept: SURGERY | Age: 60
End: 2020-06-25
Payer: COMMERCIAL

## 2020-06-25 NOTE — ANESTHESIA PREPROCEDURE EVALUATION
Relevant Problems   No relevant active problems       Anesthetic History   No history of anesthetic complications            Review of Systems / Medical History  Patient summary reviewed, nursing notes reviewed and pertinent labs reviewed    Pulmonary          Smoker      Comments: Smoker - 1 ppd x 35 years   Neuro/Psych              Cardiovascular    Hypertension          Hyperlipidemia    Exercise tolerance: >4 METS     GI/Hepatic/Renal         Renal disease: stones       Endo/Other             Other Findings   Comments: Deviated Nasal Septum  Turbinate Hypertrophy    Osteoporosis           Physical Exam    Airway  Mallampati: II  TM Distance: > 6 cm  Neck ROM: normal range of motion   Mouth opening: Normal     Cardiovascular  Regular rate and rhythm,  S1 and S2 normal,  no murmur, click, rub, or gallop             Dental         Pulmonary  Breath sounds clear to auscultation               Abdominal  GI exam deferred       Other Findings            Anesthetic Plan    ASA: 2  Anesthesia type: general    Monitoring Plan: BIS      Induction: Intravenous  Anesthetic plan and risks discussed with: Patient

## 2020-06-26 ENCOUNTER — ANESTHESIA (OUTPATIENT)
Dept: SURGERY | Age: 60
End: 2020-06-26
Payer: COMMERCIAL

## 2020-06-26 ENCOUNTER — HOSPITAL ENCOUNTER (OUTPATIENT)
Age: 60
Setting detail: OUTPATIENT SURGERY
Discharge: HOME | End: 2020-06-26
Attending: OTOLARYNGOLOGY | Admitting: OTOLARYNGOLOGY
Payer: COMMERCIAL

## 2020-06-26 VITALS
RESPIRATION RATE: 19 BRPM | DIASTOLIC BLOOD PRESSURE: 83 MMHG | SYSTOLIC BLOOD PRESSURE: 165 MMHG | WEIGHT: 108 LBS | BODY MASS INDEX: 21.2 KG/M2 | TEMPERATURE: 97.6 F | OXYGEN SATURATION: 98 % | HEART RATE: 76 BPM | HEIGHT: 60 IN

## 2020-06-26 PROCEDURE — 77030008684 HC TU ET CUF COVD -B: Performed by: STUDENT IN AN ORGANIZED HEALTH CARE EDUCATION/TRAINING PROGRAM

## 2020-06-26 PROCEDURE — 77030006689 HC BLD OPHTH BVR BD -A: Performed by: OTOLARYNGOLOGY

## 2020-06-26 PROCEDURE — 77030002974 HC SUT PLN J&J -A: Performed by: OTOLARYNGOLOGY

## 2020-06-26 PROCEDURE — 74011250636 HC RX REV CODE- 250/636: Performed by: STUDENT IN AN ORGANIZED HEALTH CARE EDUCATION/TRAINING PROGRAM

## 2020-06-26 PROCEDURE — 76030000001 HC AMB SURG OR TIME 1 TO 1.5: Performed by: OTOLARYNGOLOGY

## 2020-06-26 PROCEDURE — 74011250637 HC RX REV CODE- 250/637: Performed by: OTOLARYNGOLOGY

## 2020-06-26 PROCEDURE — 74011000250 HC RX REV CODE- 250: Performed by: OTOLARYNGOLOGY

## 2020-06-26 PROCEDURE — 74011000250 HC RX REV CODE- 250: Performed by: STUDENT IN AN ORGANIZED HEALTH CARE EDUCATION/TRAINING PROGRAM

## 2020-06-26 PROCEDURE — 77030006907 HC BLD SNUS SHV MEDT -C: Performed by: OTOLARYNGOLOGY

## 2020-06-26 PROCEDURE — 76210000040 HC AMBSU PH I REC FIRST 0.5 HR: Performed by: OTOLARYNGOLOGY

## 2020-06-26 PROCEDURE — 76060000062 HC AMB SURG ANES 1 TO 1.5 HR: Performed by: OTOLARYNGOLOGY

## 2020-06-26 PROCEDURE — 74011250636 HC RX REV CODE- 250/636: Performed by: OTOLARYNGOLOGY

## 2020-06-26 PROCEDURE — 77030021668 HC NEB PREFIL KT VYRM -A: Performed by: OTOLARYNGOLOGY

## 2020-06-26 PROCEDURE — 77030011645 HC PK NSL DOYLE MEDT -B: Performed by: OTOLARYNGOLOGY

## 2020-06-26 PROCEDURE — 74011000272 HC RX REV CODE- 272: Performed by: OTOLARYNGOLOGY

## 2020-06-26 PROCEDURE — 77030002916 HC SUT ETHLN J&J -A: Performed by: OTOLARYNGOLOGY

## 2020-06-26 PROCEDURE — 77030018836 HC SOL IRR NACL ICUM -A: Performed by: OTOLARYNGOLOGY

## 2020-06-26 PROCEDURE — 77030002888 HC SUT CHRMC J&J -A: Performed by: OTOLARYNGOLOGY

## 2020-06-26 PROCEDURE — 77030008477 HC STYL SATN SLP COVD -A: Performed by: STUDENT IN AN ORGANIZED HEALTH CARE EDUCATION/TRAINING PROGRAM

## 2020-06-26 PROCEDURE — 77030014006 HC SPNG HEMSTAT J&J -A: Performed by: OTOLARYNGOLOGY

## 2020-06-26 PROCEDURE — 77030021352 HC CBL LD SYS DISP COVD -B: Performed by: OTOLARYNGOLOGY

## 2020-06-26 PROCEDURE — 76210000050 HC AMBSU PH II REC 0.5 TO 1 HR: Performed by: OTOLARYNGOLOGY

## 2020-06-26 RX ORDER — DEXAMETHASONE SODIUM PHOSPHATE 10 MG/ML
INJECTION INTRAMUSCULAR; INTRAVENOUS
Status: DISCONTINUED
Start: 2020-06-26 | End: 2020-06-26 | Stop reason: HOSPADM

## 2020-06-26 RX ORDER — ROCURONIUM BROMIDE 10 MG/ML
INJECTION, SOLUTION INTRAVENOUS AS NEEDED
Status: DISCONTINUED | OUTPATIENT
Start: 2020-06-26 | End: 2020-06-26 | Stop reason: HOSPADM

## 2020-06-26 RX ORDER — SODIUM CHLORIDE 0.9 % (FLUSH) 0.9 %
5-40 SYRINGE (ML) INJECTION EVERY 8 HOURS
Status: DISCONTINUED | OUTPATIENT
Start: 2020-06-26 | End: 2020-06-26 | Stop reason: HOSPADM

## 2020-06-26 RX ORDER — EPHEDRINE SULFATE/0.9% NACL/PF 50 MG/5 ML
SYRINGE (ML) INTRAVENOUS AS NEEDED
Status: DISCONTINUED | OUTPATIENT
Start: 2020-06-26 | End: 2020-06-26 | Stop reason: HOSPADM

## 2020-06-26 RX ORDER — ONDANSETRON 2 MG/ML
INJECTION INTRAMUSCULAR; INTRAVENOUS AS NEEDED
Status: DISCONTINUED | OUTPATIENT
Start: 2020-06-26 | End: 2020-06-26 | Stop reason: HOSPADM

## 2020-06-26 RX ORDER — SODIUM CHLORIDE, SODIUM LACTATE, POTASSIUM CHLORIDE, CALCIUM CHLORIDE 600; 310; 30; 20 MG/100ML; MG/100ML; MG/100ML; MG/100ML
INJECTION, SOLUTION INTRAVENOUS
Status: DISCONTINUED | OUTPATIENT
Start: 2020-06-26 | End: 2020-06-26 | Stop reason: HOSPADM

## 2020-06-26 RX ORDER — HYDROMORPHONE HYDROCHLORIDE 1 MG/ML
.2-.5 INJECTION, SOLUTION INTRAMUSCULAR; INTRAVENOUS; SUBCUTANEOUS ONCE
Status: DISCONTINUED | OUTPATIENT
Start: 2020-06-26 | End: 2020-06-26 | Stop reason: HOSPADM

## 2020-06-26 RX ORDER — CLINDAMYCIN PHOSPHATE 900 MG/50ML
900 INJECTION INTRAVENOUS ONCE
Status: COMPLETED | OUTPATIENT
Start: 2020-06-26 | End: 2020-06-26

## 2020-06-26 RX ORDER — MORPHINE SULFATE 10 MG/ML
2 INJECTION, SOLUTION INTRAMUSCULAR; INTRAVENOUS
Status: DISCONTINUED | OUTPATIENT
Start: 2020-06-26 | End: 2020-06-26 | Stop reason: HOSPADM

## 2020-06-26 RX ORDER — SUCCINYLCHOLINE CHLORIDE 20 MG/ML
INJECTION INTRAMUSCULAR; INTRAVENOUS AS NEEDED
Status: DISCONTINUED | OUTPATIENT
Start: 2020-06-26 | End: 2020-06-26 | Stop reason: HOSPADM

## 2020-06-26 RX ORDER — PROPOFOL 10 MG/ML
INJECTION, EMULSION INTRAVENOUS AS NEEDED
Status: DISCONTINUED | OUTPATIENT
Start: 2020-06-26 | End: 2020-06-26 | Stop reason: HOSPADM

## 2020-06-26 RX ORDER — OXYMETAZOLINE HCL 0.05 %
3 SPRAY, NON-AEROSOL (ML) NASAL ONCE
Status: COMPLETED | OUTPATIENT
Start: 2020-06-26 | End: 2020-06-26

## 2020-06-26 RX ORDER — SODIUM CHLORIDE, SODIUM LACTATE, POTASSIUM CHLORIDE, CALCIUM CHLORIDE 600; 310; 30; 20 MG/100ML; MG/100ML; MG/100ML; MG/100ML
25 INJECTION, SOLUTION INTRAVENOUS CONTINUOUS
Status: DISCONTINUED | OUTPATIENT
Start: 2020-06-26 | End: 2020-06-26 | Stop reason: HOSPADM

## 2020-06-26 RX ORDER — LIDOCAINE HYDROCHLORIDE AND EPINEPHRINE 10; 10 MG/ML; UG/ML
INJECTION, SOLUTION INFILTRATION; PERINEURAL AS NEEDED
Status: DISCONTINUED | OUTPATIENT
Start: 2020-06-26 | End: 2020-06-26 | Stop reason: HOSPADM

## 2020-06-26 RX ORDER — BACITRACIN ZINC 500 UNIT/G
OINTMENT (GRAM) TOPICAL AS NEEDED
Status: DISCONTINUED | OUTPATIENT
Start: 2020-06-26 | End: 2020-06-26 | Stop reason: HOSPADM

## 2020-06-26 RX ORDER — LIDOCAINE HYDROCHLORIDE 20 MG/ML
INJECTION, SOLUTION EPIDURAL; INFILTRATION; INTRACAUDAL; PERINEURAL AS NEEDED
Status: DISCONTINUED | OUTPATIENT
Start: 2020-06-26 | End: 2020-06-26 | Stop reason: HOSPADM

## 2020-06-26 RX ORDER — OXYMETAZOLINE HCL 0.05 %
SPRAY, NON-AEROSOL (ML) NASAL AS NEEDED
Status: DISCONTINUED | OUTPATIENT
Start: 2020-06-26 | End: 2020-06-26 | Stop reason: HOSPADM

## 2020-06-26 RX ORDER — DEXAMETHASONE SODIUM PHOSPHATE 4 MG/ML
10 INJECTION, SOLUTION INTRA-ARTICULAR; INTRALESIONAL; INTRAMUSCULAR; INTRAVENOUS; SOFT TISSUE ONCE
Status: COMPLETED | OUTPATIENT
Start: 2020-06-26 | End: 2020-06-26

## 2020-06-26 RX ORDER — SODIUM CHLORIDE 0.9 % (FLUSH) 0.9 %
5-40 SYRINGE (ML) INJECTION AS NEEDED
Status: DISCONTINUED | OUTPATIENT
Start: 2020-06-26 | End: 2020-06-26 | Stop reason: HOSPADM

## 2020-06-26 RX ORDER — OXYMETAZOLINE HCL 0.05 %
SPRAY, NON-AEROSOL (ML) NASAL
Status: DISCONTINUED
Start: 2020-06-26 | End: 2020-06-26 | Stop reason: HOSPADM

## 2020-06-26 RX ORDER — CLINDAMYCIN PHOSPHATE 900 MG/50ML
INJECTION INTRAVENOUS
Status: DISCONTINUED
Start: 2020-06-26 | End: 2020-06-26 | Stop reason: HOSPADM

## 2020-06-26 RX ORDER — LIDOCAINE HYDROCHLORIDE 10 MG/ML
0.1 INJECTION, SOLUTION EPIDURAL; INFILTRATION; INTRACAUDAL; PERINEURAL AS NEEDED
Status: DISCONTINUED | OUTPATIENT
Start: 2020-06-26 | End: 2020-06-26 | Stop reason: HOSPADM

## 2020-06-26 RX ORDER — FENTANYL CITRATE 50 UG/ML
INJECTION, SOLUTION INTRAMUSCULAR; INTRAVENOUS AS NEEDED
Status: DISCONTINUED | OUTPATIENT
Start: 2020-06-26 | End: 2020-06-26 | Stop reason: HOSPADM

## 2020-06-26 RX ORDER — FENTANYL CITRATE 50 UG/ML
25 INJECTION, SOLUTION INTRAMUSCULAR; INTRAVENOUS
Status: DISCONTINUED | OUTPATIENT
Start: 2020-06-26 | End: 2020-06-26 | Stop reason: HOSPADM

## 2020-06-26 RX ORDER — SODIUM CHLORIDE 9 MG/ML
INJECTION, SOLUTION INTRAVENOUS
Status: COMPLETED | OUTPATIENT
Start: 2020-06-26 | End: 2020-06-26

## 2020-06-26 RX ORDER — MIDAZOLAM HYDROCHLORIDE 1 MG/ML
INJECTION, SOLUTION INTRAMUSCULAR; INTRAVENOUS AS NEEDED
Status: DISCONTINUED | OUTPATIENT
Start: 2020-06-26 | End: 2020-06-26 | Stop reason: HOSPADM

## 2020-06-26 RX ORDER — OXYCODONE AND ACETAMINOPHEN 5; 325 MG/1; MG/1
1 TABLET ORAL
Status: DISCONTINUED | OUTPATIENT
Start: 2020-06-26 | End: 2020-06-26 | Stop reason: HOSPADM

## 2020-06-26 RX ORDER — DIPHENHYDRAMINE HYDROCHLORIDE 50 MG/ML
12.5 INJECTION, SOLUTION INTRAMUSCULAR; INTRAVENOUS AS NEEDED
Status: DISCONTINUED | OUTPATIENT
Start: 2020-06-26 | End: 2020-06-26 | Stop reason: HOSPADM

## 2020-06-26 RX ORDER — PHENYLEPHRINE HCL IN 0.9% NACL 0.4MG/10ML
SYRINGE (ML) INTRAVENOUS AS NEEDED
Status: DISCONTINUED | OUTPATIENT
Start: 2020-06-26 | End: 2020-06-26 | Stop reason: HOSPADM

## 2020-06-26 RX ORDER — HYDROMORPHONE HYDROCHLORIDE 2 MG/ML
INJECTION, SOLUTION INTRAMUSCULAR; INTRAVENOUS; SUBCUTANEOUS AS NEEDED
Status: DISCONTINUED | OUTPATIENT
Start: 2020-06-26 | End: 2020-06-26 | Stop reason: HOSPADM

## 2020-06-26 RX ADMIN — Medication 80 MCG: at 09:44

## 2020-06-26 RX ADMIN — ROCURONIUM BROMIDE 5 MG: 10 INJECTION INTRAVENOUS at 09:23

## 2020-06-26 RX ADMIN — FENTANYL CITRATE 50 MCG: 50 INJECTION, SOLUTION INTRAMUSCULAR; INTRAVENOUS at 09:23

## 2020-06-26 RX ADMIN — CLINDAMYCIN PHOSPHATE 900 MG: 18 INJECTION, SOLUTION INTRAVENOUS at 09:26

## 2020-06-26 RX ADMIN — Medication 80 MCG: at 09:41

## 2020-06-26 RX ADMIN — Medication 10 MG: at 09:51

## 2020-06-26 RX ADMIN — MIDAZOLAM HYDROCHLORIDE 2 MG: 1 INJECTION, SOLUTION INTRAMUSCULAR; INTRAVENOUS at 09:14

## 2020-06-26 RX ADMIN — LIDOCAINE HYDROCHLORIDE 100 MG: 20 INJECTION, SOLUTION INTRAVENOUS at 09:23

## 2020-06-26 RX ADMIN — ONDANSETRON HYDROCHLORIDE 4 MG: 2 INJECTION, SOLUTION INTRAMUSCULAR; INTRAVENOUS at 09:35

## 2020-06-26 RX ADMIN — DEXAMETHASONE SODIUM PHOSPHATE 10 MG: 4 INJECTION, SOLUTION INTRAMUSCULAR; INTRAVENOUS at 08:45

## 2020-06-26 RX ADMIN — SUCCINYLCHOLINE CHLORIDE 120 MG: 20 INJECTION, SOLUTION INTRAMUSCULAR; INTRAVENOUS at 09:23

## 2020-06-26 RX ADMIN — PROPOFOL 150 MG: 10 INJECTION, EMULSION INTRAVENOUS at 09:23

## 2020-06-26 RX ADMIN — SODIUM CHLORIDE, POTASSIUM CHLORIDE, SODIUM LACTATE AND CALCIUM CHLORIDE: 600; 310; 30; 20 INJECTION, SOLUTION INTRAVENOUS at 09:17

## 2020-06-26 RX ADMIN — Medication 80 MCG: at 09:47

## 2020-06-26 RX ADMIN — FENTANYL CITRATE 50 MCG: 50 INJECTION, SOLUTION INTRAMUSCULAR; INTRAVENOUS at 09:33

## 2020-06-26 RX ADMIN — OXYMETAZOLINE HCL 3 SPRAY: 0.05 SPRAY NASAL at 08:43

## 2020-06-26 RX ADMIN — HYDROMORPHONE HYDROCHLORIDE 0.4 MG: 2 INJECTION, SOLUTION INTRAMUSCULAR; INTRAVENOUS; SUBCUTANEOUS at 09:59

## 2020-06-26 NOTE — BRIEF OP NOTE
Brief Postoperative Note    Patient: Deirdre Julian  YOB: 1960  MRN: [id number]    Date of Procedure: 6/26/2020     Pre-Op Diagnosis: DEVIATED SEPTUM, TURBINATE HYPERTROPHY    Post-Op Diagnosis: Same      Procedure(s):  SEPTOPLASTY TURBINATE REDUCTION    Surgeon(s):  Gunnar Bonilla MD    Surgical Assistant: None    Anesthesia: General     Estimated Blood Loss (US):88    Complications: None    Specimens: * No specimens in log *     Implants: * No implants in log *    Drains: * No LDAs found *    Findings: As expected    Electronically Signed by Valentina Sanders MD on 6/26/2020 at 10:13 AM

## 2020-06-26 NOTE — DISCHARGE INSTRUCTIONS
[facility] Josy Wheeler. Juan Pfeiffer M.D., Ph.D., F.A.C.S. Lesa Milleru, [address] [facility]  [phone]    POST OPERATIVE INSTRUCTIONS-SEPTOPLASTY    The following instructions are designed to help you recover from surgery as easily as possible. Taking care of yourself can prevent complications. It is very important that you read this sheet often and follow the instructions carefully while you are at home. Your doctor or nurse will be happy to answer any questions. 1. DIET:  You may resume your normal diet in 24 hours. We suggest nothing heavy or greasy and avoid dairy products the first 24 hours. It is important to remember that good overall diet and health promotes healing. 2.  ACTIVITY RESTRICTIONS:  The following guidelines should be followed until your doctor tells you otherwise:   Do not blow your nose, sniff and if you have to sneeze or cough-do with mouth open   Do not lift anything over five pounds.  Do not bend over. Avoid doing things like tying your shoes or picking up things off the floor.  Do not do heavy exercise or play contact sports   Do not strain for a bowel movement. If you are constipated, take a stool softener or a gentle laxative.  Go back to work or school in one week. 3.  HOW TO TAKE CARE OF YOUR NOSE AND SINUSES:  Take the antibiotic prescribed by your doctor. Call if you have any problems or questions. We expect bloody drainage from your nose. You are to change the dressing below your nose as needed and expect to do this 10 to 12 times the first day. We want the drainage to come forward and not down your throat. We suggest you rest and sleep elevated on several pillows on your side. You will have less drainage each day and the swelling will reduce in several days. Call the office if you have bloody saturated gauze more than once an hour.     Clean the nasal openings twice daily with a Q-tip soaked in hydrogen peroxide until clear of all crusts. Take the pain medication prescribed by your doctor as needed for discomfort. No Aspirin, Motrin, Alleve, Advil or similar products for 3 weeks. You make take Tylenol (Acetaminophen) when you no longer need prescribed medication. Do not take Tylenol on top of pain medication because Tylenol is in the pain medication. Avoid smoke, dust, fumes or anything else that might irritate the nose. Protect yourself from any unexpected injury to your nose or face, such as being hit by small children or a restless bedmate. You may find that a cool mist room humidifier is helpful in decreasing the amount of dryness in your nose and mouth. After surgery you should use a nasal saline spray such as Ayr or Simply Saline. Use 4 sprays in each nostril every two hours while awake to help prevent crusts from forming in your nose. 4. RETURN APPOINTMENT:   You need to make a follow-up appointment with your doctor in 5 days. At this time your nose will be gently and carefully examined and your packing will be removed. 5. NOTIFY YOUR DOCTOR IF YOU HAVE:    A sudden increase in the amount of bleeding from the nose   A fever above 101 degrees F, which persists despite increasing the amount of fluids you take. A person with a fever should try to drink one cup of water each waking hour.  Persistent sharp pain that is not relieved by the pain medication you receive.  Increased swelling or redness of your nose. If you have any questions or concerns following your surgery do not hesitate to contact our office at [phone]     [address]      1. [address] YOUR HANDS     To prevent infection, good handwashing is the most important thing you or your caregiver can do.  Wash your hands with soap and water or use the hand  we gave you before you touch any wounds.     2. SHOWER     Use the antibacterial soap we gave you when you take a shower.  Shower with this soap until your wounds are healed.  To reach all areas of your body, you may need someone to help you.  Dont forget to clean your belly button with every shower. 3.  USE CLEAN SHEETS     Use freshly cleaned sheets on your bed after surgery.  To keep the surgery site clean, do not allow pets to sleep with you while your wound is still healing. 4. STOP SMOKING     Stop smoking, or at least cut back on smoking     Smoking slows your healing. 5.  CONTROL YOUR BLOOD SUGAR     High blood sugars slow wound healing.  If you are diabetic, control your blood sugar levels before and after your surgery. DO NOT TAKE TYLENOL/ACETAMINOPHEN WITH HYDROCODONE/NORCO. TAKE NARCOTIC PAIN MEDICATIONS WITH FOOD     Narcotics tend to be constipating, we suggest taking a stool softener such as Colace or Miralax (follow package instructions). DO NOT DRIVE WHILE TAKING NARCOTIC PAIN MEDICATIONS. DO NOT TAKE SLEEPING MEDICATIONS OR ANTIANXIETY MEDICATIONS WHILE TAKING NARCOTIC PAIN MEDICATIONS,  ESPECIALLY THE NIGHT OF ANESTHESIA! CPAP PATIENTS BE SURE TO WEAR MACHINE WHENEVER NAPPING OR SLEEPING! DISCHARGE SUMMARY from Nurse    The following personal items collected during your admission are returned to you:   Dental Appliance: Dental Appliances: None  Vision: Visual Aid: None  Hearing Aid:    Jewelry: Jewelry: None  Clothing: Clothing: Shirt, Shorts, Footwear, With patient  Other Valuables: Other Valuables: None  Valuables sent to safe:        PATIENT INSTRUCTIONS:    After General Anesthesia or Intravenous Sedation, for 24 hours or while taking prescription Narcotics:        Someone should be with you for the next 24 hours. For your own safety, a responsible adult must drive you home. · Limit your activities  · Recommended activity: Rest today, up with assistance today.  Do not climb stairs or shower unattended for the next 24 hours. · Please start with a soft bland diet and advance as tolerated (no nausea) to regular diet. · If you have a sore throat you should try the following: fluids, warm salt water gargles, or throat lozenges. If it does not improve after several days please follow up with your primary physician. · Do not drive and operate hazardous machinery  · Do not make important personal or business decisions  · Do  not drink alcoholic beverages  · If you have not urinated within 8 hours after discharge, please contact your surgeon on call. Report the following to your surgeon:  · Excessive pain, swelling, redness or odor of or around the surgical area  · Temperature over 100.5  · Nausea and vomiting lasting longer than 4 hours or if unable to take medications  · Any signs of decreased circulation or nerve impairment to extremity: change in color, persistent  numbness, tingling, coldness or increase pain      · You will receive a Post Operative Call from one of the Recovery Room Nurses on the day after your surgery to check on you. It is very important for us to know how you are recovering after your surgery. If you have an issue or need to speak with someone, please call your surgeon, do not wait for the post operative call. · You may receive an e-mail or letter in the mail from [employer] regarding your experience with us in the Ambulatory Surgery Unit. Your feedback is valuable to us and we appreciate your participation in the survey. · If the above instructions are not adequate or you are having problems after your surgery, call the physician at their office number. · We wish you a speedy recovery ? Patient Education        Learning About Coronavirus [phone])  Coronavirus [phone]): Overview  What is coronavirus (ASZYY-81)? The coronavirus disease (COVID-19) is caused by a virus. It is an illness that was first found in [place], [place], in December 2019. It has since spread worldwide.   The virus can cause fever, cough, and trouble breathing. In severe cases, it can cause pneumonia and make it hard to breathe without help. It can cause death. Coronaviruses are a large group of viruses. They cause the common cold. They also cause more serious illnesses like Middle East respiratory syndrome (MERS) and severe acute respiratory syndrome (SARS). COVID-19 is caused by a novel coronavirus. That means it's a new type that has not been seen in people before. This virus spreads person-to-person through droplets from coughing and sneezing. It can also spread when you are close to someone who is infected. And it can spread when you touch something that has the virus on it, such as a doorknob or a tabletop. What can you do to protect yourself from coronavirus (COVID-19)? The best way to protect yourself from getting sick is to:  · Avoid areas where there is an outbreak. · Avoid contact with people who may be infected. · Wash your hands often with soap or alcohol-based hand sanitizers. · Avoid crowds and try to stay at least 6 feet away from other people. · Wash your hands often, especially after you cough or sneeze. Use soap and water, and scrub for at least 20 seconds. If soap and water aren't available, use an alcohol-based hand . · Avoid touching your mouth, nose, and eyes. What can you do to avoid spreading the virus to others? To help avoid spreading the virus to others:  · Cover your mouth with a tissue when you cough or sneeze. Then throw the tissue in the trash. · Use a disinfectant to clean things that you touch often. · Wear a cloth face cover if you have to go to public areas. · Stay home if you are sick or have been exposed to the virus. Don't go to school, work, or public areas. And don't use public transportation, ride-shares, or taxis unless you have no choice. · If you are sick:  ? Leave your home only if you need to get medical care.  But call the doctor's office first so they know you're coming. And wear a face cover. ? Wear the face cover whenever you're around other people. It can help stop the spread of the virus when you cough or sneeze. ? Clean and disinfect your home every day. Use household  and disinfectant wipes or sprays. Take special care to clean things that you grab with your hands. These include doorknobs, remote controls, phones, and handles on your refrigerator and microwave. And don't forget countertops, tabletops, bathrooms, and computer keyboards. When to call for help  [id number] anytime you think you may need emergency care. For example, call if:  · You have severe trouble breathing. (You can't talk at all.)  · You have constant chest pain or pressure. · You are severely dizzy or lightheaded. · You are confused or can't think clearly. · Your face and lips have a blue color. · You pass out (lose consciousness) or are very hard to wake up. Call your doctor now if you develop symptoms such as:  · Shortness of breath. · Fever. · Cough. If you need to get care, call ahead to the doctor's office for instructions before you go. Make sure you wear a face cover to prevent exposing other people to the virus. Where can you get the latest information? The following health organizations are tracking and studying this virus. Their websites contain the most up-to-date information. Stacie Anger also learn what to do if you think you may have been exposed to the virus. · U.S. Centers for Disease Control and Prevention (CDC): The CDC provides updated news about the disease and travel advice. The website also tells you how to prevent the spread of infection. www.cdc.gov  · World Health Organization [facility]): WHO offers information about the virus outbreaks. WHO also has travel advice. www.who.int  Current as of: May 8, 2020               Content Version: 12.5  © 2006-2020 Healthwise, Incorporated.    Care instructions adapted under license by [employer] (which disclaims liability or warranty for this information). If you have questions about a medical condition or this instruction, always ask your healthcare professional. [address] any warranty or liability for your use of this information. What to do at Home:      *  Please give a list of your current medications to your Primary Care Provider. *  Please update this list whenever your medications are discontinued, doses are      changed, or new medications (including over-the-counter products) are added. *  Please carry medication information at all times in case of emergency situations. If you have not received your influenza and/or pneumococcal vaccine, please follow up with your primary care physician. The discharge information has been reviewed with the patient and caregiver. The patient and caregiver verbalized understanding.

## 2020-06-26 NOTE — PERIOP NOTES
Permission received to review discharge instructions and discuss private health information with boyfriend Saumya Pham. Patient states that Saumya Pham will be with them for at least 24 hours following today's procedure.

## 2020-06-26 NOTE — PERIOP NOTES
Kit Tate  1960  [id number]    Situation:  Verbal report given from:   Procedure: Procedure(s):  SEPTOPLASTY TURBINATE REDUCTION    Background:    Preoperative diagnosis: DEVIATED SEPTUM, TURBINATE HYPERTROPHY    Postoperative diagnosis: DEVIATED SEPTUM, TURBINATE HYPERTROPHY    :  Dr. Yash Potts    Assistant(s): Circ-1: Yolanda Arthur  Circ-Relief: Can Marie RN  Scrub Tech-1: Carrington Dan    Specimens: * No specimens in log *    Assessment:  Intra-procedure medications         Anesthesia gave intra-procedure sedation and medications, see anesthesia flow sheet     Intravenous fluids: LR@ KVO     Vital signs stable       Recommendation:    Permission to share finding with Marina Senior (boyfriend) : yes

## 2020-06-26 NOTE — PERIOP NOTES
Telephone order from Dr. Hector Newman for decadron 10mg IV and afrin 3 sprays both nostrils prior to surgery. Medications given at this time.

## 2020-06-27 NOTE — OP NOTES
[address]  OPERATIVE REPORT    Name:  Brandyn Tracy  MR#:  [id number]  :  1960  ACCOUNT #:  [de-identified]  DATE OF SERVICE:  2020    PREOPERATIVE DIAGNOSES:  Nasal septal deviation, turbinate hypertrophy. POSTOPERATIVE DIAGNOSES:  Nasal septal deviation, turbinate hypertrophy. PROCEDURES PERFORMED:  Septoplasty, bilateral inferior turbinate submucosal resection with therapeutic outfracture. SURGEON:  Anson Stubbs MD    ASSISTANT:  None. ANESTHESIA:  General endotracheal tube anesthesia. COMPLICATIONS:  None apparent. SPECIMENS REMOVED:  None. IMPLANTS:  Temporary Means splints. ESTIMATED BLOOD LOSS:  25 mL. INDICATIONS:  The patient is a 54-year-old female with a long history of chronic nasal airway obstruction which has been refractory to medical therapy. Preoperatively, the alternatives, potential benefits and possible risks of the procedure were explained to the patient who understood and requested to proceed. PROCEDURE:  The patient received 0.25% oxymetazoline in the preanesthesia holding area as an intranasal spray. The patient was brought to the operating room, placed supine on the operating table and following the smooth induction of general endotracheal tube anesthesia, the table was turned 90 degrees and the patient was positioned for operation. 1% lidocaine with epinephrine was injected in a submucoperichondrial and submucoperiosteal fashion from anterior to posterior. Further oxymetazoline on neurosurgical cottonoids was placed intranasally. A routine Betadine prep and drape was carried out. A 6400 [place] blade was used to initiate a left hemitransfixion incision. A submucoperichondrial and submucoperiosteal elevation of membranes was carried out from anterior to posterior. The osteocartilaginous junction was identified and  with a [place] calibrated elevator.   The deformed and obstructing portions of posterior osseous septum were removed with a Tushar-Cruz bone punch. A spur arising from the maxillary crest was removed with a 4-mm osteotome. A 2-mm strip of anterior superior quadrangular cartilage was removed to allow a swinging door return to midline of the planar portions of quadrangular cartilage. Satisfactory reduction of the nasal septal constituents was observed. Attention was turned to turbinate reduction. Using the Straightshot handpiece with a 2-mm turbinate blade attached, a submucosal resection of the right and left inferior turbinate soft tissues was carried out. After adequate soft tissue had been resected, a Boies elevator was used to perform a therapeutic outfracture of the inferior turbinate on each side. A satisfactory nasal airway was subsequently observed and attention was turned to closure. Interrupted 4-0 chromic was used to approximate the margins of the left hemitransfixion incision. A running 4-0 plain gut suture was used to approximate septal membranes to the midline. Means ventilated splints were placed and secured with a nylon mattress suture. Bacitracin-coated Gelfoam was placed on each side of the nasal cavity to further support the inferior turbinates in a lateralized position. With application of a sterile nasal dressing, the patient's procedure was concluded. The patient was aroused from general anesthesia, extubated in the operating room and transferred to the recovery area in satisfactory condition.       Reina Parikh MD [id number]  D:  06/26/2020 10:29  T:  06/26/2020 21:14  JOB #:  [id number]  CC:  Denice Dubin, MD Cordella Nest, MD

## 2020-06-27 NOTE — ANESTHESIA POSTPROCEDURE EVALUATION
Procedure(s):  SEPTOPLASTY TURBINATE REDUCTION. general    Anesthesia Post Evaluation        Patient location during evaluation: PACU  Note status: Adequate. Level of consciousness: responsive to verbal stimuli and sleepy but conscious  Pain management: satisfactory to patient  Airway patency: patent  Anesthetic complications: no  Cardiovascular status: acceptable  Respiratory status: acceptable  Hydration status: acceptable  Comments: +Post-Anesthesia Evaluation and Assessment    Patient: Chanda Springer MRN: [id number]  SSN: [id number]   YOB: 1960  Age: 61 y.o. Sex: female      Cardiovascular Function/Vital Signs    /83   Pulse 76   Temp 36.4 °C (97.6 °F)   Resp 19   Ht 5' (1.524 m)   Wt 49 kg (108 lb)   SpO2 98%   BMI 21.09 kg/m²     Patient is status post Procedure(s):  SEPTOPLASTY TURBINATE REDUCTION. Nausea/Vomiting: Controlled. Postoperative hydration reviewed and adequate. Pain:  Pain Scale 1: Numeric (0 - 10) (06/26/20 1113)  Pain Intensity 1: 0 (06/26/20 1113)   Managed. Neurological Status:   Neuro (WDL): Within Defined Limits (06/26/20 1113)   At baseline. Mental Status and Level of Consciousness: Arousable. Pulmonary Status:   O2 Device: Room air (06/26/20 1113)   Adequate oxygenation and airway patent. Complications related to anesthesia: None    Post-anesthesia assessment completed. No concerns. Signed By: Julia Medina MD    6/27/2020  Post anesthesia nausea and vomiting:  controlled      INITIAL Post-op Vital signs:   Vitals Value Taken Time   /87 6/26/2020 10:45 AM   Temp 36.4 °C (97.6 °F) 6/26/2020 10:50 AM   Pulse 89 6/26/2020 10:51 AM   Resp 19 6/26/2020 10:51 AM   SpO2 97 % 6/26/2020 10:51 AM   Vitals shown include unvalidated device data.

## 2020-10-27 ENCOUNTER — TRANSCRIBE ORDER (OUTPATIENT)
Dept: SCHEDULING | Age: 60
End: 2020-10-27

## 2020-10-27 DIAGNOSIS — N20.0 RENAL STONES: Primary | ICD-10-CM

## 2020-10-27 DIAGNOSIS — N20.1 CALCULUS OF URETER: ICD-10-CM

## 2020-10-30 ENCOUNTER — HOSPITAL ENCOUNTER (OUTPATIENT)
Dept: CT IMAGING | Age: 60
Discharge: HOME | End: 2020-10-30
Attending: UROLOGY
Payer: COMMERCIAL

## 2020-10-30 DIAGNOSIS — N20.1 CALCULUS OF URETER: ICD-10-CM

## 2020-10-30 DIAGNOSIS — N20.0 RENAL STONES: ICD-10-CM

## 2020-10-30 PROCEDURE — 74176 CT ABD & PELVIS W/O CONTRAST: CPT

## 2020-12-07 ENCOUNTER — HOSPITAL ENCOUNTER (EMERGENCY)
Age: 60
Discharge: HOME | End: 2020-12-07
Attending: EMERGENCY MEDICINE
Payer: COMMERCIAL

## 2020-12-07 VITALS
RESPIRATION RATE: 16 BRPM | HEIGHT: 60 IN | BODY MASS INDEX: 21.01 KG/M2 | TEMPERATURE: 97.9 F | HEART RATE: 82 BPM | WEIGHT: 107 LBS | OXYGEN SATURATION: 99 % | SYSTOLIC BLOOD PRESSURE: 144 MMHG | DIASTOLIC BLOOD PRESSURE: 67 MMHG

## 2020-12-07 DIAGNOSIS — Z98.890 HISTORY OF REMOVAL OF URETERAL STENT: ICD-10-CM

## 2020-12-07 DIAGNOSIS — R10.9 ACUTE RIGHT FLANK PAIN: Primary | ICD-10-CM

## 2020-12-07 LAB
ALBUMIN SERPL-MCNC: 3.7 G/DL (ref 3.5–5)
ALBUMIN/GLOB SERPL: 1.2 {RATIO} (ref 1.1–2.2)
ALP SERPL-CCNC: 87 U/L (ref 45–117)
ALT SERPL-CCNC: 25 U/L (ref 12–78)
ANION GAP SERPL CALC-SCNC: 4 MMOL/L (ref 5–15)
APPEARANCE UR: ABNORMAL
AST SERPL-CCNC: 12 U/L (ref 15–37)
BACTERIA URNS QL MICRO: ABNORMAL /HPF
BASOPHILS # BLD: 0 K/UL (ref 0–0.1)
BASOPHILS NFR BLD: 0 % (ref 0–1)
BILIRUB SERPL-MCNC: 1 MG/DL (ref 0.2–1)
BILIRUB UR QL: NEGATIVE
BUN SERPL-MCNC: 16 MG/DL (ref 6–20)
BUN/CREAT SERPL: 14 (ref 12–20)
CALCIUM SERPL-MCNC: 9.2 MG/DL (ref 8.5–10.1)
CHLORIDE SERPL-SCNC: 106 MMOL/L (ref 97–108)
CO2 SERPL-SCNC: 28 MMOL/L (ref 21–32)
COLOR UR: ABNORMAL
CREAT SERPL-MCNC: 1.13 MG/DL (ref 0.55–1.02)
DIFFERENTIAL METHOD BLD: NORMAL
EOSINOPHIL # BLD: 0.2 K/UL (ref 0–0.4)
EOSINOPHIL NFR BLD: 2 % (ref 0–7)
EPITH CASTS URNS QL MICRO: ABNORMAL /LPF
ERYTHROCYTE [DISTWIDTH] IN BLOOD BY AUTOMATED COUNT: 12.6 % (ref 11.5–14.5)
GLOBULIN SER CALC-MCNC: 3.2 G/DL (ref 2–4)
GLUCOSE SERPL-MCNC: 163 MG/DL (ref 65–100)
GLUCOSE UR STRIP.AUTO-MCNC: NEGATIVE MG/DL
HCT VFR BLD AUTO: 44.9 % (ref 35–47)
HGB BLD-MCNC: 15.1 G/DL (ref 11.5–16)
HGB UR QL STRIP: ABNORMAL
IMM GRANULOCYTES # BLD AUTO: 0 K/UL (ref 0–0.04)
IMM GRANULOCYTES NFR BLD AUTO: 0 % (ref 0–0.5)
KETONES UR QL STRIP.AUTO: ABNORMAL MG/DL
LEUKOCYTE ESTERASE UR QL STRIP.AUTO: ABNORMAL
LYMPHOCYTES # BLD: 1.7 K/UL (ref 0.8–3.5)
LYMPHOCYTES NFR BLD: 18 % (ref 12–49)
MCH RBC QN AUTO: 32.5 PG (ref 26–34)
MCHC RBC AUTO-ENTMCNC: 33.6 G/DL (ref 30–36.5)
MCV RBC AUTO: 96.8 FL (ref 80–99)
MONOCYTES # BLD: 0.8 K/UL (ref 0–1)
MONOCYTES NFR BLD: 9 % (ref 5–13)
MUCOUS THREADS URNS QL MICRO: ABNORMAL /LPF
NEUTS SEG # BLD: 6.7 K/UL (ref 1.8–8)
NEUTS SEG NFR BLD: 71 % (ref 32–75)
NITRITE UR QL STRIP.AUTO: NEGATIVE
NRBC # BLD: 0 K/UL (ref 0–0.01)
NRBC BLD-RTO: 0 PER 100 WBC
PH UR STRIP: 6 [PH] (ref 5–8)
PLATELET # BLD AUTO: 238 K/UL (ref 150–400)
PMV BLD AUTO: 9.4 FL (ref 8.9–12.9)
POTASSIUM SERPL-SCNC: 3.5 MMOL/L (ref 3.5–5.1)
PROT SERPL-MCNC: 6.9 G/DL (ref 6.4–8.2)
PROT UR STRIP-MCNC: 100 MG/DL
RBC # BLD AUTO: 4.64 M/UL (ref 3.8–5.2)
RBC #/AREA URNS HPF: ABNORMAL /HPF (ref 0–5)
SODIUM SERPL-SCNC: 138 MMOL/L (ref 136–145)
SP GR UR REFRACTOMETRY: 1.01 (ref 1–1.03)
UA: UC IF INDICATED,UAUC: ABNORMAL
UROBILINOGEN UR QL STRIP.AUTO: 0.2 EU/DL (ref 0.2–1)
WBC # BLD AUTO: 9.5 K/UL (ref 3.6–11)
WBC URNS QL MICRO: ABNORMAL /HPF (ref 0–4)

## 2020-12-07 PROCEDURE — 96374 THER/PROPH/DIAG INJ IV PUSH: CPT

## 2020-12-07 PROCEDURE — 85025 COMPLETE CBC W/AUTO DIFF WBC: CPT

## 2020-12-07 PROCEDURE — 96361 HYDRATE IV INFUSION ADD-ON: CPT

## 2020-12-07 PROCEDURE — 99284 EMERGENCY DEPT VISIT MOD MDM: CPT

## 2020-12-07 PROCEDURE — 74011250636 HC RX REV CODE- 250/636: Performed by: PHYSICIAN ASSISTANT

## 2020-12-07 PROCEDURE — 80053 COMPREHEN METABOLIC PANEL: CPT

## 2020-12-07 PROCEDURE — 81001 URINALYSIS AUTO W/SCOPE: CPT

## 2020-12-07 PROCEDURE — 36415 COLL VENOUS BLD VENIPUNCTURE: CPT

## 2020-12-07 PROCEDURE — 96375 TX/PRO/DX INJ NEW DRUG ADDON: CPT

## 2020-12-07 PROCEDURE — 87086 URINE CULTURE/COLONY COUNT: CPT

## 2020-12-07 RX ORDER — SODIUM CHLORIDE 9 MG/ML
500 INJECTION, SOLUTION INTRAVENOUS ONCE
Status: COMPLETED | OUTPATIENT
Start: 2020-12-07 | End: 2020-12-07

## 2020-12-07 RX ORDER — KETOROLAC TROMETHAMINE 30 MG/ML
15 INJECTION, SOLUTION INTRAMUSCULAR; INTRAVENOUS
Status: COMPLETED | OUTPATIENT
Start: 2020-12-07 | End: 2020-12-07

## 2020-12-07 RX ORDER — ONDANSETRON 2 MG/ML
4 INJECTION INTRAMUSCULAR; INTRAVENOUS
Status: COMPLETED | OUTPATIENT
Start: 2020-12-07 | End: 2020-12-07

## 2020-12-07 RX ORDER — SULFAMETHOXAZOLE AND TRIMETHOPRIM 800; 160 MG/1; MG/1
1 TABLET ORAL 2 TIMES DAILY
Qty: 14 TAB | Refills: 0 | Status: SHIPPED | OUTPATIENT
Start: 2020-12-07 | End: 2020-12-14

## 2020-12-07 RX ORDER — HYDROMORPHONE HYDROCHLORIDE 1 MG/ML
0.5 INJECTION, SOLUTION INTRAMUSCULAR; INTRAVENOUS; SUBCUTANEOUS ONCE
Status: COMPLETED | OUTPATIENT
Start: 2020-12-07 | End: 2020-12-07

## 2020-12-07 RX ADMIN — SODIUM CHLORIDE 500 ML: 900 INJECTION, SOLUTION INTRAVENOUS at 16:44

## 2020-12-07 RX ADMIN — HYDROMORPHONE HYDROCHLORIDE 0.5 MG: 1 INJECTION, SOLUTION INTRAMUSCULAR; INTRAVENOUS; SUBCUTANEOUS at 16:46

## 2020-12-07 RX ADMIN — ONDANSETRON 4 MG: 2 INJECTION INTRAMUSCULAR; INTRAVENOUS at 16:45

## 2020-12-07 RX ADMIN — KETOROLAC TROMETHAMINE 15 MG: 30 INJECTION, SOLUTION INTRAMUSCULAR; INTRAVENOUS at 16:47

## 2020-12-07 NOTE — ED NOTES
Gladys Sears reviewed discharge instructions with the patient. The patient verbalized understanding. All questions and concerns were addressed. The patient declined a wheelchair and is discharged ambulatory in the care of family members with instructions and prescriptions in hand. Pt is alert and oriented x 4. Respirations are clear and unlabored.

## 2020-12-07 NOTE — ED TRIAGE NOTES
Pt came from home by EMS. Pt had a urinary stent removed this morning after it was placed Friday after removal of kidney stones. Pt complains pain 10/10. Pt had a half of a 325mg hydrocodone at 0900 and again 1100.

## 2020-12-07 NOTE — ED PROVIDER NOTES
EMERGENCY DEPARTMENT HISTORY AND PHYSICAL EXAM      Date: 12/7/2020  Patient Name: Lou Trotter    History of Presenting Illness     Chief Complaint   Patient presents with    Flank Pain     Pt had stent removed this after placement  of stent for kidney stones procedure earlier this week       History Provided By: Patient and EMS    HPI: Lou Trotter, 61 y.o. female with PMHx significant for hypertension, hypercholesterolemia, kidney stones, presents to the ED with cc of severe right flank pain. The patient had a ureteral stent placed last week and had it removed this morning. Since then, the pain has gradually worsened and is now severe. She has associated nausea and hematuria. She tried taking PO dilaudid without relief of pain. She denies fevers, vomiting. There are no other complaints, changes, or physical findings at this time. PCP: Kush Flores MD    No current facility-administered medications on file prior to encounter.       Current Outpatient Medications on File Prior to Encounter   Medication Sig Dispense Refill    atorvastatin (LIPITOR) 20 mg tablet TAKE 1 TABLET BY MOUTH DAILY 90 Tab 3    lisinopril (PRINIVIL, ZESTRIL) 20 mg tablet TAKE 1 TABLET BY MOUTH DAILY 90 Tab 3       Past History     Past Medical History:  Past Medical History:   Diagnosis Date    High cholesterol     Hypertension     Kidney stones        Past Surgical History:  Past Surgical History:   Procedure Laterality Date    HX COLONOSCOPY      HX HEMORRHOIDECTOMY      HX ORTHOPAEDIC      bone bx    HX UROLOGICAL      kidney stone tx       Family History:  Family History   Problem Relation Age of Onset    Elevated Lipids Mother     Hypertension Mother     Stroke Mother     Heart Disease Father     Hypertension Father        Social History:  Social History     Tobacco Use    Smoking status: Current Every Day Smoker     Packs/day: 1.00     Years: 35.00     Pack years: 35.00     Types: Cigarettes    Smokeless tobacco: Never Used   Substance Use Topics    Alcohol use: Yes     Alcohol/week: 3.0 - 5.0 standard drinks     Types: 3 - 5 Cans of beer per week     Frequency: 2-4 times a month     Drinks per session: 1 or 2     Binge frequency: Less than monthly    Drug use: Not Currently     Types: Prescription, OTC       Allergies: Allergies   Allergen Reactions    Contrast Dye [Iodine] Hives    Pcn [Penicillins] Hives         Review of Systems   Review of Systems   Constitutional: Negative for chills and fever. HENT: Negative for ear pain and sore throat. Eyes: Negative for redness and visual disturbance. Respiratory: Negative for cough and shortness of breath. Cardiovascular: Negative for chest pain and palpitations. Gastrointestinal: Negative for abdominal pain, nausea and vomiting. Genitourinary: Positive for flank pain. Negative for dysuria and hematuria. Musculoskeletal: Negative for back pain and gait problem. Skin: Negative for rash and wound. Neurological: Negative for dizziness and headaches. Psychiatric/Behavioral: Negative for behavioral problems and confusion. All other systems reviewed and are negative. Physical Exam   Physical Exam  Vitals signs and nursing note reviewed. Constitutional:       Appearance: She is well-developed. She is not toxic-appearing. Comments: Patient is very uncomfortable appearing. HENT:      Head: Normocephalic and atraumatic. Eyes:      Conjunctiva/sclera: Conjunctivae normal.      Pupils: Pupils are equal, round, and reactive to light. Neck:      Musculoskeletal: Normal range of motion and neck supple. Cardiovascular:      Rate and Rhythm: Normal rate and regular rhythm. Heart sounds: Normal heart sounds. Pulmonary:      Effort: Pulmonary effort is normal. No respiratory distress. Breath sounds: Normal breath sounds. No wheezing, rhonchi or rales. Abdominal:      Comments: Abdomen is flat and soft.  There is no tenderness to palpation. No rebound or guarding. No CVA tenderness. Musculoskeletal: Normal range of motion. Skin:     General: Skin is warm and dry. Findings: No rash. Neurological:      Mental Status: She is alert and oriented to person, place, and time. GCS: GCS eye subscore is 4. GCS verbal subscore is 5. GCS motor subscore is 6. Cranial Nerves: No cranial nerve deficit. Sensory: No sensory deficit. Psychiatric:         Behavior: Behavior normal.           Diagnostic Study Results     Labs -     Recent Results (from the past 12 hour(s))   CBC WITH AUTOMATED DIFF    Collection Time: 12/07/20  4:16 PM   Result Value Ref Range    WBC 9.5 3.6 - 11.0 K/uL    RBC 4.64 3.80 - 5.20 M/uL    HGB 15.1 11.5 - 16.0 g/dL    HCT 44.9 35.0 - 47.0 %    MCV 96.8 80.0 - 99.0 FL    MCH 32.5 26.0 - 34.0 PG    MCHC 33.6 30.0 - 36.5 g/dL    RDW 12.6 11.5 - 14.5 %    PLATELET 860 893 - 961 K/uL    MPV 9.4 8.9 - 12.9 FL    NRBC 0.0 0  WBC    ABSOLUTE NRBC 0.00 0.00 - 0.01 K/uL    NEUTROPHILS 71 32 - 75 %    LYMPHOCYTES 18 12 - 49 %    MONOCYTES 9 5 - 13 %    EOSINOPHILS 2 0 - 7 %    BASOPHILS 0 0 - 1 %    IMMATURE GRANULOCYTES 0 0.0 - 0.5 %    ABS. NEUTROPHILS 6.7 1.8 - 8.0 K/UL    ABS. LYMPHOCYTES 1.7 0.8 - 3.5 K/UL    ABS. MONOCYTES 0.8 0.0 - 1.0 K/UL    ABS. EOSINOPHILS 0.2 0.0 - 0.4 K/UL    ABS. BASOPHILS 0.0 0.0 - 0.1 K/UL    ABS. IMM.  GRANS. 0.0 0.00 - 0.04 K/UL    DF AUTOMATED     METABOLIC PANEL, COMPREHENSIVE    Collection Time: 12/07/20  4:16 PM   Result Value Ref Range    Sodium 138 136 - 145 mmol/L    Potassium 3.5 3.5 - 5.1 mmol/L    Chloride 106 97 - 108 mmol/L    CO2 28 21 - 32 mmol/L    Anion gap 4 (L) 5 - 15 mmol/L    Glucose 163 (H) 65 - 100 mg/dL    BUN 16 6 - 20 MG/DL    Creatinine 1.13 (H) 0.55 - 1.02 MG/DL    BUN/Creatinine ratio 14 12 - 20      GFR est AA 60 (L) >60 ml/min/1.73m2    GFR est non-AA 49 (L) >60 ml/min/1.73m2    Calcium 9.2 8.5 - 10.1 MG/DL    Bilirubin, total 1.0 0.2 - 1.0 MG/DL    ALT (SGPT) 25 12 - 78 U/L    AST (SGOT) 12 (L) 15 - 37 U/L    Alk. phosphatase 87 45 - 117 U/L    Protein, total 6.9 6.4 - 8.2 g/dL    Albumin 3.7 3.5 - 5.0 g/dL    Globulin 3.2 2.0 - 4.0 g/dL    A-G Ratio 1.2 1.1 - 2.2     URINALYSIS W/ REFLEX CULTURE    Collection Time: 12/07/20  5:27 PM    Specimen: Urine   Result Value Ref Range    Color BROWN      Appearance CLOUDY (A) CLEAR      Specific gravity 1.012 1.003 - 1.030      pH (UA) 6.0 5.0 - 8.0      Protein 100 (A) NEG mg/dL    Glucose Negative NEG mg/dL    Ketone TRACE (A) NEG mg/dL    Bilirubin Negative NEG      Blood LARGE (A) NEG      Urobilinogen 0.2 0.2 - 1.0 EU/dL    Nitrites Negative NEG      Leukocyte Esterase MODERATE (A) NEG      WBC 20-50 0 - 4 /hpf    RBC  0 - 5 /hpf    Epithelial cells FEW FEW /lpf    Bacteria 1+ (A) NEG /hpf    UA:UC IF INDICATED URINE CULTURE ORDERED (A) CNI      Mucus TRACE (A) NEG /lpf       Radiologic Studies -   No orders to display     CT Results  (Last 48 hours)    None        CXR Results  (Last 48 hours)    None            Medical Decision Making   I am the first provider for this patient. I reviewed the vital signs, available nursing notes, past medical history, past surgical history, family history and social history. Vital Signs-Reviewed the patient's vital signs. Patient Vitals for the past 12 hrs:   Temp Pulse Resp BP SpO2   12/07/20 1654 97.9 °F (36.6 °C) 82 16 (!) 144/67 99 %         Records Reviewed: Nursing Notes, Old Medical Records, Previous Radiology Studies and Previous Laboratory Studies      Provider Notes (Medical Decision Making):   DDx: ureteral spasms, kidney stone, UTI    Patient presents with severe right flank pain after ureteral stent removal this morning. I reviewed the procedure note and there were some kidney stone fragments remaining. Pain may have been caused by ureteral spasm versus remaining stones. She is afebrile and nontoxic-appearing.   She feels much better after pain medication and is tolerating oral intake. No indication for imaging at this time. Urinalysis does show evidence of UTI, will treat with Bactrim. Discussed follow-up with urology and return precautions. Discussed plan with Dr. Kevin Anne, supervising physician, who agrees with the plan. ED Course:   Initial assessment performed. The patients presenting problems have been discussed, and they are in agreement with the care plan formulated and outlined with them. I have encouraged them to ask questions as they arise throughout their visit. ED Course as of Dec 07 2350   Mon Dec 07, 2020   1722 Rechecked patient, she feels much better and her pain is gone. She is tolerating oral intake and would like to be discharged. Will check urinalysis prior to discharge. [CURTIS]      ED Course User Index  [CURTIS] Mary Haley         Disposition:  6:10 PM  The patient has been re-evaluated and is ready for discharge. Reviewed available results with patient. Counseled patient on diagnosis and care plan. Patient has expressed understanding, and all questions have been answered. Patient agrees with plan and agrees to follow up as recommended, or to return to the ED if their symptoms worsen. Discharge instructions have been provided and explained to the patient, along with reasons to return to the ED. PLAN:  1. Discharge Medication List as of 12/7/2020  6:11 PM      START taking these medications    Details   trimethoprim-sulfamethoxazole (Bactrim DS) 160-800 mg per tablet Take 1 Tab by mouth two (2) times a day for 7 days. , Normal, Disp-14 Tab,R-0         CONTINUE these medications which have NOT CHANGED    Details   atorvastatin (LIPITOR) 20 mg tablet TAKE 1 TABLET BY MOUTH DAILY, Normal, Disp-90 Tab, R-3      lisinopril (PRINIVIL, ZESTRIL) 20 mg tablet TAKE 1 TABLET BY MOUTH DAILY, Normal, Disp-90 Tab, R-3           2.    Follow-up Information     Follow up With Specialties Details Why Contact Radha Adan MD Urology Go to  as scheduled for a recheck [address] [facility]  [address]  [facility]  [phone]      [facility] EMERGENCY DEPT Emergency Medicine Go to  If symptoms worsen [address] [place] [address]  [phone]        Return to ED if worse     Diagnosis     Clinical Impression:   1. Acute right flank pain    2. History of removal of ureteral stent            Bernarda Newton.  MODESTO Blank

## 2020-12-08 LAB
BACTERIA SPEC CULT: NORMAL
SERVICE CMNT-IMP: NORMAL

## 2021-01-28 RX ORDER — LISINOPRIL 20 MG/1
TABLET ORAL
Qty: 90 TAB | Refills: 3 | Status: SHIPPED | OUTPATIENT
Start: 2021-01-28 | End: 2022-01-24

## 2021-05-24 ENCOUNTER — TELEPHONE (OUTPATIENT)
Dept: INTERNAL MEDICINE CLINIC | Age: 61
End: 2021-05-24

## 2021-05-24 NOTE — TELEPHONE ENCOUNTER
Patient is complaining of bloating and upset stomach at this time and is requesting labs in regards to symptoms.

## 2021-05-24 NOTE — TELEPHONE ENCOUNTER
----- Message from Fredo Carrasco sent at 5/24/2021  9:32 AM EDT -----  Regarding: Dr. Whipple Mode: [phone]  General Message/Vendor Calls    Caller's first and last name: N/A      Reason for call: Requesting lab orders      Callback required yes/no and why: yes/scheduling      Best contact number(s): [phone]      Details to clarify the request: PT also requesting callback regarding bloating feeling and sour stomach      Message from [facility]

## 2021-05-24 NOTE — TELEPHONE ENCOUNTER
Patient states she needs a call back today Please to get Labs Done thru [address] on Wednesday Morning for Symptoms in Previous Encounter. Please call to discuss.  Thank you

## 2021-05-25 ENCOUNTER — VIRTUAL VISIT (OUTPATIENT)
Dept: INTERNAL MEDICINE CLINIC | Age: 61
End: 2021-05-25
Payer: COMMERCIAL

## 2021-05-25 DIAGNOSIS — E78.00 PURE HYPERCHOLESTEROLEMIA: ICD-10-CM

## 2021-05-25 DIAGNOSIS — I10 ESSENTIAL HYPERTENSION: Primary | ICD-10-CM

## 2021-05-25 DIAGNOSIS — F17.200 SMOKER: ICD-10-CM

## 2021-05-25 PROCEDURE — 99213 OFFICE O/P EST LOW 20 MIN: CPT | Performed by: INTERNAL MEDICINE

## 2021-05-25 NOTE — PROGRESS NOTES
Identified pt with two pt identifiers(name and ). Reviewed record in preparation for visit and have obtained necessary documentation. Chief Complaint   Patient presents with    Abdominal Pain      started last week, wants blood work done         There were no vitals filed for this visit. Health Maintenance Due   Topic    Pneumococcal 0-64 years (1 of 2 - PPSV23)    COVID-19 Vaccine (1)    DTaP/Tdap/Td series (1 - Tdap)    PAP AKA CERVICAL CYTOLOGY     Shingrix Vaccine Age 50> (1 of 2)    Breast Cancer Screen Mammogram     Lipid Screen        Coordination of Care Questionnaire:  :   1) Have you been to an emergency room, urgent care, or hospitalized since your last visit? If yes, where when, and reason for visit? Kidney stone [facility]      2. Have seen or consulted any other health care provider since your last visit? If yes, where when, and reason for visit? NO      Patient is accompanied by self I have received verbal consent from Roberto Martínez to discuss any/all medical information while they are present in the room.

## 2021-05-25 NOTE — PROGRESS NOTES
HISTORY OF PRESENT ILLNESS  Cyndie Bonilla is a 61 y.o. female. HPI     Cyndie Bonilla, was evaluated through a synchronous (real-time) audio-video encounter. The patient (or guardian if applicable) is aware that this is a billable service. Verbal consent to proceed has been obtained within the past 12 months. The visit was conducted pursuant to the emergency declaration under the [facility], [address] [place] [address] and the [employer]. Patient identification was verified, and a caregiver was present when appropriate. The patient was located in a state where the provider was credentialed to provide care. --Abelardo Michael MD on 5/25/2021 at 10:01 AM    An electronic signature was used to authenticate this note. Fu HTN HLD smoker,. Osteoporosis  Had kidney stones and ureteral stent placed lat year-Dr Vail.  Still has stones I left kidney  2 polyps removed last year-Dr Daniella Aceves for [facility] in [place] get mammo and repeat dexa  For osteopenia  Smokes 1/2 ppd  Not inclined to get any vaccines  No cp or sob  No recent bp readings  Had some abdominal bloating 2d ago but resolved now  Last OV    Here for CPE and f/u HTN HLD smoker  Had recent dexa and well woman exam per gyn MD-pt reports no hx of osteoporosis on prior dexa  1 pk q4d   Feels well overall  Reports med compliance  Woke up this week with right subconjunctival hemorrhage-asymptomatic    Patient Active Problem List    Diagnosis Date Noted    Osteoporosis 02/25/2020    Cigarette smoker 10/19/2018    Essential hypertension, benign 03/24/2010    Pure hypercholesterolemia 03/24/2010    Kidney stones 03/24/2010     Current Outpatient Medications   Medication Sig Dispense Refill    atorvastatin (LIPITOR) 20 mg tablet TAKE 1 TABLET DAILY 90 Tab 1    lisinopriL (PRINIVIL, ZESTRIL) 20 mg tablet TAKE 1 TABLET DAILY 90 Tab 3 Allergies   Allergen Reactions    Contrast Dye [Iodine] Hives    Pcn [Penicillins] Hives      Lab Results   Component Value Date/Time    WBC 9.5 12/07/2020 04:16 PM    HGB 15.1 12/07/2020 04:16 PM    HCT 44.9 12/07/2020 04:16 PM    PLATELET 404 78/13/4931 04:16 PM    MCV 96.8 12/07/2020 04:16 PM     Lab Results   Component Value Date/Time    Glucose 163 (H) 12/07/2020 04:16 PM    LDL, calculated 89 02/14/2020 08:24 AM    Creatinine 1.13 (H) 12/07/2020 04:16 PM      Lab Results   Component Value Date/Time    Cholesterol, total 170 02/14/2020 08:24 AM    HDL Cholesterol 63 02/14/2020 08:24 AM    LDL, calculated 89 02/14/2020 08:24 AM    Triglyceride 91 02/14/2020 08:24 AM    CHOL/HDL Ratio 5.2 (H) 11/06/2009 08:38 AM     Lab Results   Component Value Date/Time    GFR est non-AA 49 (L) 12/07/2020 04:16 PM    GFR est AA 60 (L) 12/07/2020 04:16 PM    Creatinine 1.13 (H) 12/07/2020 04:16 PM    BUN 16 12/07/2020 04:16 PM    Sodium 138 12/07/2020 04:16 PM    Potassium 3.5 12/07/2020 04:16 PM    Chloride 106 12/07/2020 04:16 PM    CO2 28 12/07/2020 04:16 PM        ROS    Physical Exam  Constitutional:       Appearance: Normal appearance. Pulmonary:      Effort: Pulmonary effort is normal.   Neurological:      General: No focal deficit present. Mental Status: She is alert. ASSESSMENT and PLAN  Diagnoses and all orders for this visit:    1. Essential hypertension  -     CBC W/O DIFF; Future  -     METABOLIC PANEL, COMPREHENSIVE; Future   bp monitoring recommended  2. Pure hypercholesterolemia  -     METABOLIC PANEL, COMPREHENSIVE; Future  -     LIPID PANEL; Future  -     TSH 3RD GENERATION; Future   On statin  3. Smoker   Advised cessation  4. Preventive   recommended covid shingrix tdpa  and pneumovax.    Fu gyn MD this year    rtc 6 months fu HTN

## 2021-05-25 NOTE — TELEPHONE ENCOUNTER
Called patient. ID verified with Name and . Patient states that she doesn't have appt for labs at this time but believes she is due for labs. Informed patient that her last OV was over a year ago. Patient states that she would like to set up appt to have labs done at this time. Any date/time availability? Does not have to be this week.

## 2021-05-29 LAB
ALBUMIN SERPL-MCNC: 4.2 G/DL (ref 3.8–4.9)
ALBUMIN/GLOB SERPL: 2.2 {RATIO} (ref 1.2–2.2)
ALP SERPL-CCNC: 85 IU/L (ref 48–121)
ALT SERPL-CCNC: 22 IU/L (ref 0–32)
AST SERPL-CCNC: 20 IU/L (ref 0–40)
BILIRUB SERPL-MCNC: 0.5 MG/DL (ref 0–1.2)
BUN SERPL-MCNC: 15 MG/DL (ref 8–27)
BUN/CREAT SERPL: 16 (ref 12–28)
CALCIUM SERPL-MCNC: 9.2 MG/DL (ref 8.7–10.3)
CHLORIDE SERPL-SCNC: 107 MMOL/L (ref 96–106)
CHOLEST SERPL-MCNC: 168 MG/DL (ref 100–199)
CO2 SERPL-SCNC: 26 MMOL/L (ref 20–29)
CREAT SERPL-MCNC: 0.92 MG/DL (ref 0.57–1)
ERYTHROCYTE [DISTWIDTH] IN BLOOD BY AUTOMATED COUNT: 12.4 % (ref 11.7–15.4)
GLOBULIN SER CALC-MCNC: 1.9 G/DL (ref 1.5–4.5)
GLUCOSE SERPL-MCNC: 77 MG/DL (ref 65–99)
HCT VFR BLD AUTO: 44.3 % (ref 34–46.6)
HDLC SERPL-MCNC: 58 MG/DL
HGB BLD-MCNC: 15.2 G/DL (ref 11.1–15.9)
LDLC SERPL CALC-MCNC: 92 MG/DL (ref 0–99)
MCH RBC QN AUTO: 32.9 PG (ref 26.6–33)
MCHC RBC AUTO-ENTMCNC: 34.3 G/DL (ref 31.5–35.7)
MCV RBC AUTO: 96 FL (ref 79–97)
PLATELET # BLD AUTO: 280 X10E3/UL (ref 150–450)
POTASSIUM SERPL-SCNC: 4.1 MMOL/L (ref 3.5–5.2)
PROT SERPL-MCNC: 6.1 G/DL (ref 6–8.5)
RBC # BLD AUTO: 4.62 X10E6/UL (ref 3.77–5.28)
SODIUM SERPL-SCNC: 147 MMOL/L (ref 134–144)
TRIGL SERPL-MCNC: 96 MG/DL (ref 0–149)
TSH SERPL DL<=0.005 MIU/L-ACNC: 1.07 UIU/ML (ref 0.45–4.5)
VLDLC SERPL CALC-MCNC: 18 MG/DL (ref 5–40)
WBC # BLD AUTO: 7.9 X10E3/UL (ref 3.4–10.8)

## 2021-07-20 ENCOUNTER — TELEPHONE (OUTPATIENT)
Dept: INTERNAL MEDICINE CLINIC | Age: 61
End: 2021-07-20

## 2021-07-20 NOTE — TELEPHONE ENCOUNTER
----- Message from Christine Pastrana sent at 7/20/2021  8:50 AM EDT -----  Regarding: Dr. Raul Mendoza Message/Vendor Calls    Caller's first and last name: N/A      Reason for call: Discuss medical issues with Dr. Amparo Ohara or his nurse. Callback required yes/no and why: yes       Best contact number(s): [phone]      Details to clarify the request: Pt did not want to disclose any information regarding this call.       Christine Pastrana

## 2021-07-22 NOTE — TELEPHONE ENCOUNTER
----- Message from [facility] sent at 7/22/2021  2:52 PM EDT -----  Regarding: Dr. Peter Ibarra  Patient return call    Caller's first and last name and relationship (if not the patient): Pt      Best contact number(s): [phone]      Whose call is being returned: Keyonna Mcbride (maybe)      Details to clarify the request: Jodie Ocampo

## 2021-07-22 NOTE — TELEPHONE ENCOUNTER
I have attempted without success to contact this patient by phone. Personal VM box set up. Left generic message to return call to office at earliest convenience. Awaiting call back at this time.

## 2021-07-23 RX ORDER — TRAZODONE HYDROCHLORIDE 50 MG/1
50 TABLET ORAL
Qty: 30 TABLET | Refills: 1 | Status: SHIPPED | OUTPATIENT
Start: 2021-07-23

## 2021-07-23 NOTE — TELEPHONE ENCOUNTER
Called patient. ID verified with Name and . Spoke with patient, informed patient that provider has sent trazodone to pharmacy on file. Patient had no further questions or concerns at this time.

## 2021-07-23 NOTE — TELEPHONE ENCOUNTER
I have attempted without success to contact this patient by phone. Unable to reach patient at this time. Personal VM, left generic message to return call to office at earliest convenience. Awaiting call back at this time.

## 2021-07-23 NOTE — TELEPHONE ENCOUNTER
Patient returned call. ID verified with Name and . Patient states that she has been experiencing some anxiety and insomnia due to stress. Patient states that she would like to try a \"mild\" medication that will keep her calm during the day and to help her get some sleep at night. Patient states that she will fall asleep but will wake up in the middle of the night and has a hard time falling back to sleep.

## 2021-09-21 ENCOUNTER — VIRTUAL VISIT (OUTPATIENT)
Dept: INTERNAL MEDICINE CLINIC | Age: 61
End: 2021-09-21

## 2021-09-21 NOTE — PATIENT INSTRUCTIONS
This is an established visit conducted via telemedicine. The patient has been instructed that this meets HIPAA criteria and acknowledges and agrees to this method of visitation.     Yaritza Sandovalut  91/16/81  3:81 PM

## 2021-10-25 RX ORDER — ATORVASTATIN CALCIUM 20 MG/1
TABLET, FILM COATED ORAL
Qty: 90 TABLET | Refills: 3 | Status: SHIPPED | OUTPATIENT
Start: 2021-10-25 | End: 2022-01-28 | Stop reason: SDUPTHER

## 2021-11-15 ENCOUNTER — OFFICE VISIT (OUTPATIENT)
Dept: INTERNAL MEDICINE CLINIC | Age: 61
End: 2021-11-15
Payer: COMMERCIAL

## 2021-11-15 VITALS
SYSTOLIC BLOOD PRESSURE: 130 MMHG | RESPIRATION RATE: 16 BRPM | DIASTOLIC BLOOD PRESSURE: 80 MMHG | BODY MASS INDEX: 21.79 KG/M2 | HEART RATE: 64 BPM | TEMPERATURE: 97.8 F | WEIGHT: 111 LBS | OXYGEN SATURATION: 96 % | HEIGHT: 60 IN

## 2021-11-15 DIAGNOSIS — I10 HYPERTENSION, UNSPECIFIED TYPE: ICD-10-CM

## 2021-11-15 DIAGNOSIS — Z23 ENCOUNTER FOR IMMUNIZATION: ICD-10-CM

## 2021-11-15 DIAGNOSIS — Z12.2 SCREENING FOR LUNG CANCER: Primary | ICD-10-CM

## 2021-11-15 DIAGNOSIS — K14.8 TONGUE LESION: ICD-10-CM

## 2021-11-15 DIAGNOSIS — E78.00 PURE HYPERCHOLESTEROLEMIA: ICD-10-CM

## 2021-11-15 DIAGNOSIS — Z00.00 ROUTINE GENERAL MEDICAL EXAMINATION AT A HEALTH CARE FACILITY: ICD-10-CM

## 2021-11-15 DIAGNOSIS — M81.0 OSTEOPOROSIS, UNSPECIFIED OSTEOPOROSIS TYPE, UNSPECIFIED PATHOLOGICAL FRACTURE PRESENCE: ICD-10-CM

## 2021-11-15 PROCEDURE — 90715 TDAP VACCINE 7 YRS/> IM: CPT | Performed by: INTERNAL MEDICINE

## 2021-11-15 PROCEDURE — 90471 IMMUNIZATION ADMIN: CPT | Performed by: INTERNAL MEDICINE

## 2021-11-15 PROCEDURE — 99396 PREV VISIT EST AGE 40-64: CPT | Performed by: INTERNAL MEDICINE

## 2021-11-15 RX ORDER — NYSTATIN 100000 [USP'U]/ML
1 SUSPENSION ORAL 4 TIMES DAILY
Qty: 100 ML | Refills: 0 | Status: SHIPPED | OUTPATIENT
Start: 2021-11-15 | End: 2021-11-20

## 2021-11-15 NOTE — PATIENT INSTRUCTIONS
Office Policies    Phone calls/patient messages:            Please allow up to 24 hours for someone in the office to contact you about your call or message. Be mindful your provider may be out of the office or your message may require further review. We encourage you to use [employer] for your messages as this is a faster, more efficient way to communicate with our office                         Medication Refills:            Prescription medications require 48-72 business hours to process. We encourage you to use [employer] for your refills. For controlled medications: Please allow 72 business hours to process. Certain medications may require you to  a written prescription at our office. NO narcotic/controlled medications will be prescribed after 4pm Monday through Friday or on weekends              Form/Paperwork Completion:            Please note a $25 fee may incur for all paperwork for completed by our providers. We ask that you allow 7-10 business days. Pre-payment is due prior to picking up/faxing the completed form. You may also download your forms to [employer] to have your doctor print off.

## 2021-11-15 NOTE — PROGRESS NOTES
HISTORY OF PRESENT ILLNESS  Saumya Bridges is a 64 y.o. female. HPI   Fu HTN HLD smoker,. Osteoporosis and CPE  Had recent dexa--T-2.9 fem neck T-1.9 total hip and T -2.0 spine. Last Vit d 27  Tongue turns dark with red wine or coffee  Has sinus congestion and takes steroid nasal spray which seems to make her cough    C/o dry cough at night and mucous in morning--for several months  Tobacco 1/2 ppd a 40 yrs  etoh 5-7 drinks per week  Last OV    Had kidney stones and ureteral stent placed lat year-Dr Vail. Still has stones I left kidney  2 polyps removed last year-Dr Shelbie Wagner for [facility] in [place] get mammo and repeat dexa  For osteopenia  Smokes 1/2 ppd  Not inclined to get any vaccines  No cp or sob  No recent bp readings  Had some abdominal bloating 2d ago but resolved now    Patient Active Problem List    Diagnosis Date Noted    Osteoporosis 02/25/2020    Cigarette smoker 10/19/2018    Essential hypertension, benign 03/24/2010    Pure hypercholesterolemia 03/24/2010    Kidney stones 03/24/2010     Current Outpatient Medications   Medication Sig Dispense Refill    atorvastatin (LIPITOR) 20 mg tablet TAKE 1 TABLET DAILY 90 Tablet 3    traZODone (DESYREL) 50 mg tablet Take 1 Tablet by mouth nightly.  30 Tablet 1    lisinopriL (PRINIVIL, ZESTRIL) 20 mg tablet TAKE 1 TABLET DAILY 90 Tab 3     Allergies   Allergen Reactions    Contrast Dye [Iodine] Hives    Pcn [Penicillins] Hives      Lab Results   Component Value Date/Time    WBC 7.9 05/28/2021 08:06 AM    HGB 15.2 05/28/2021 08:06 AM    HCT 44.3 05/28/2021 08:06 AM    PLATELET 255 87/16/4948 08:06 AM    MCV 96 05/28/2021 08:06 AM     Lab Results   Component Value Date/Time    Glucose 77 05/28/2021 08:06 AM    LDL, calculated 92 05/28/2021 08:06 AM    LDL, calculated 89 02/14/2020 08:24 AM    Creatinine 0.92 05/28/2021 08:06 AM      Lab Results   Component Value Date/Time    Cholesterol, total 168 05/28/2021 08:06 AM HDL Cholesterol 58 05/28/2021 08:06 AM    LDL, calculated 92 05/28/2021 08:06 AM    LDL, calculated 89 02/14/2020 08:24 AM    Triglyceride 96 05/28/2021 08:06 AM    CHOL/HDL Ratio 5.2 (H) 11/06/2009 08:38 AM     Lab Results   Component Value Date/Time    GFR est non-AA 68 05/28/2021 08:06 AM    GFR est AA 78 05/28/2021 08:06 AM    Creatinine 0.92 05/28/2021 08:06 AM    BUN 15 05/28/2021 08:06 AM    Sodium 147 (H) 05/28/2021 08:06 AM    Potassium 4.1 05/28/2021 08:06 AM    Chloride 107 (H) 05/28/2021 08:06 AM    CO2 26 05/28/2021 08:06 AM        ROS    Physical Exam  Vitals and nursing note reviewed. Constitutional:       Appearance: Normal appearance. She is well-developed. Comments: Appears stated age   Cardiovascular:      Rate and Rhythm: Normal rate and regular rhythm. Heart sounds: Normal heart sounds. No murmur heard. No friction rub. No gallop. Pulmonary:      Effort: Pulmonary effort is normal. No respiratory distress. Breath sounds: Normal breath sounds. No wheezing. Abdominal:      General: Bowel sounds are normal.      Palpations: Abdomen is soft. Neurological:      Mental Status: She is alert. ASSESSMENT and PLAN  Diagnoses and all orders for this visit:    1. Screening for lung cancer  -     CT LOW DOSE LUNG CANCER SCREENING; Future    2. Routine general medical examination at a health care facility   Labs UTD   Advised tobacco cessation   LDCT discussed with pt-agreeable to yearly scan   Will fu gyn MD for pap   tdap today, declines other vacccinations    3. Tongue lesion   ? Thrush   Trial of nystatin  4. Osteoporosis, unspecified osteoporosis type, unspecified pathological fracture presence   Start prolia q 6 months   otc caltrate D bid  5. Hypertension, unspecified type   controlled  6. Pure hypercholesterolemia   LDL at goal  7.  Encounter for immunization  -     TETANUS, DIPHTHERIA TOXOIDS AND ACELLULAR PERTUSSIS VACCINE (TDAP), IN INDIVIDS. >=7, IM    Other orders  -     nystatin (MYCOSTATIN) 100,000 unit/mL suspension; Take 5 mL by mouth four (4) times daily for 5 days. swish and spit      Follow-up and Dispositions    · Return in about 1 year (around 11/15/2022) for CPE.

## 2021-11-23 ENCOUNTER — HOSPITAL ENCOUNTER (OUTPATIENT)
Dept: CT IMAGING | Age: 61
Discharge: HOME | End: 2021-11-23
Attending: INTERNAL MEDICINE
Payer: COMMERCIAL

## 2021-11-23 DIAGNOSIS — Z12.2 SCREENING FOR LUNG CANCER: ICD-10-CM

## 2021-11-23 PROCEDURE — 71271 CT THORAX LUNG CANCER SCR C-: CPT

## 2021-12-13 ENCOUNTER — TRANSCRIBE ORDER (OUTPATIENT)
Dept: SCHEDULING | Age: 61
End: 2021-12-13

## 2021-12-13 DIAGNOSIS — R10.9 ABDOMINAL PAIN: Primary | ICD-10-CM

## 2021-12-15 ENCOUNTER — HOSPITAL ENCOUNTER (OUTPATIENT)
Dept: CT IMAGING | Age: 61
Discharge: HOME | End: 2021-12-15
Attending: UROLOGY
Payer: COMMERCIAL

## 2021-12-15 DIAGNOSIS — R10.9 ABDOMINAL PAIN: ICD-10-CM

## 2021-12-15 PROCEDURE — 74176 CT ABD & PELVIS W/O CONTRAST: CPT

## 2022-01-24 RX ORDER — LISINOPRIL 20 MG/1
TABLET ORAL
Qty: 90 TABLET | Refills: 3 | Status: SHIPPED | OUTPATIENT
Start: 2022-01-24 | End: 2022-01-28

## 2022-01-28 ENCOUNTER — TELEPHONE (OUTPATIENT)
Dept: INTERNAL MEDICINE CLINIC | Age: 62
End: 2022-01-28

## 2022-01-28 DIAGNOSIS — I10 ESSENTIAL HYPERTENSION, BENIGN: Primary | ICD-10-CM

## 2022-01-28 DIAGNOSIS — E78.2 MIXED HYPERLIPIDEMIA: ICD-10-CM

## 2022-01-28 RX ORDER — ATORVASTATIN CALCIUM 20 MG/1
20 TABLET, FILM COATED ORAL DAILY
Qty: 90 TABLET | Refills: 3 | Status: SHIPPED | OUTPATIENT
Start: 2022-01-28

## 2022-01-28 RX ORDER — LISINOPRIL 20 MG/1
TABLET ORAL
Qty: 90 TABLET | Refills: 3 | Status: SHIPPED | OUTPATIENT
Start: 2022-01-28 | End: 2022-01-28 | Stop reason: SDUPTHER

## 2022-01-28 RX ORDER — LISINOPRIL 20 MG/1
20 TABLET ORAL DAILY
Qty: 90 TABLET | Refills: 3 | Status: SHIPPED | OUTPATIENT
Start: 2022-01-28

## 2022-01-28 NOTE — TELEPHONE ENCOUNTER
I have attempted without success to contact this patient by phone. Unable to reach patient at this time. Personal VM set up, left generic message requesting that patient return call to office in regards to message received. Awaiting call back at this time.

## 2022-01-28 NOTE — TELEPHONE ENCOUNTER
----- Message from Jairon Manzano sent at 1/28/2022 12:30 PM EST -----  Subject: Message to Provider    QUESTIONS  Information for Provider? Pt called and stated that she needs to talk to   the nurse in the office. Pt would like a call back today .   ---------------------------------------------------------------------------  --------------  CALL BACK INFO  What is the best way for the office to contact you? OK to leave message on   voicemail  Preferred Call Back Phone Number? [phone]  ---------------------------------------------------------------------------  --------------  SCRIPT ANSWERS  Relationship to Patient?  Self

## 2022-02-03 ENCOUNTER — TRANSCRIBE ORDER (OUTPATIENT)
Dept: SCHEDULING | Age: 62
End: 2022-02-03

## 2022-02-03 DIAGNOSIS — N20.0 KIDNEY STONE: Primary | ICD-10-CM

## 2022-02-10 ENCOUNTER — HOSPITAL ENCOUNTER (OUTPATIENT)
Dept: CT IMAGING | Age: 62
Discharge: HOME | End: 2022-02-10
Attending: UROLOGY
Payer: COMMERCIAL

## 2022-02-10 DIAGNOSIS — N20.0 KIDNEY STONE: ICD-10-CM

## 2022-02-10 PROCEDURE — 74011000636 HC RX REV CODE- 636: Performed by: UROLOGY

## 2022-02-10 PROCEDURE — 74178 CT ABD&PLV WO CNTR FLWD CNTR: CPT

## 2022-02-10 RX ADMIN — IOPAMIDOL 100 ML: 755 INJECTION, SOLUTION INTRAVENOUS at 16:57

## 2022-02-14 DIAGNOSIS — E27.8 ADRENAL NODULE (HCC): ICD-10-CM

## 2022-03-18 PROBLEM — M81.0 OSTEOPOROSIS: Status: ACTIVE | Noted: 2020-02-25

## 2022-03-18 PROBLEM — F17.210 CIGARETTE SMOKER: Status: ACTIVE | Noted: 2018-10-19

## 2022-04-06 ENCOUNTER — TRANSCRIBE ORDER (OUTPATIENT)
Dept: SCHEDULING | Age: 62
End: 2022-04-06

## 2022-04-06 DIAGNOSIS — R91.1 LUNG NODULE, SOLITARY: Primary | ICD-10-CM

## 2022-04-18 ENCOUNTER — PATIENT MESSAGE (OUTPATIENT)
Dept: INTERNAL MEDICINE CLINIC | Age: 62
End: 2022-04-18

## 2022-09-06 RX ORDER — FLUTICASONE PROPIONATE 50 MCG
2 SPRAY, SUSPENSION (ML) NASAL DAILY
Qty: 1 EACH | Refills: 5 | Status: SHIPPED | OUTPATIENT
Start: 2022-09-06

## 2022-10-10 NOTE — TELEPHONE ENCOUNTER
PCP: Brittney Joseph MD    Last appt: 3/5/2020  No future appointments.     Requested Prescriptions     Pending Prescriptions Disp Refills    atorvastatin (LIPITOR) 20 mg tablet 90 Tab 3 Xenograft Text: The defect edges were debeveled with a #15 scalpel blade.  Given the location of the defect, shape of the defect and the proximity to free margins a xenograft was deemed most appropriate.  The graft was then trimmed to fit the size of the defect.  The graft was then placed in the primary defect and oriented appropriately.

## 2022-11-08 ENCOUNTER — TRANSCRIBE ORDER (OUTPATIENT)
Dept: SCHEDULING | Age: 62
End: 2022-11-08

## 2022-11-08 ENCOUNTER — TELEPHONE (OUTPATIENT)
Dept: INTERNAL MEDICINE CLINIC | Age: 62
End: 2022-11-08

## 2022-11-08 DIAGNOSIS — N20.1 CALCULUS OF URETER: Primary | ICD-10-CM

## 2022-11-08 NOTE — TELEPHONE ENCOUNTER
Patient wants to get labs done prior to physical. She is requesting to come in this Thursday morning for a lab draw.

## 2022-11-09 ENCOUNTER — TRANSCRIBE ORDER (OUTPATIENT)
Dept: SCHEDULING | Age: 62
End: 2022-11-09

## 2022-11-09 DIAGNOSIS — Z00.00 ROUTINE GENERAL MEDICAL EXAMINATION AT A HEALTH CARE FACILITY: Primary | ICD-10-CM

## 2022-11-09 DIAGNOSIS — R91.1 LUNG NODULE, SOLITARY: Primary | ICD-10-CM

## 2022-11-10 ENCOUNTER — APPOINTMENT (OUTPATIENT)
Dept: INTERNAL MEDICINE CLINIC | Age: 62
End: 2022-11-10

## 2022-11-10 DIAGNOSIS — Z00.00 ROUTINE GENERAL MEDICAL EXAMINATION AT A HEALTH CARE FACILITY: ICD-10-CM

## 2022-11-11 LAB
ALBUMIN SERPL-MCNC: 3.9 G/DL (ref 3.5–5)
ALBUMIN/GLOB SERPL: 1.5 {RATIO} (ref 1.1–2.2)
ALP SERPL-CCNC: 75 U/L (ref 45–117)
ALT SERPL-CCNC: 28 U/L (ref 12–78)
ANION GAP SERPL CALC-SCNC: 3 MMOL/L (ref 5–15)
APPEARANCE UR: CLEAR
AST SERPL-CCNC: 13 U/L (ref 15–37)
BACTERIA URNS QL MICRO: ABNORMAL /HPF
BILIRUB SERPL-MCNC: 0.7 MG/DL (ref 0.2–1)
BILIRUB UR QL: NEGATIVE
BUN SERPL-MCNC: 15 MG/DL (ref 6–20)
BUN/CREAT SERPL: 17 (ref 12–20)
CALCIUM SERPL-MCNC: 9.3 MG/DL (ref 8.5–10.1)
CHLORIDE SERPL-SCNC: 108 MMOL/L (ref 97–108)
CHOLEST SERPL-MCNC: 161 MG/DL
CO2 SERPL-SCNC: 31 MMOL/L (ref 21–32)
COLOR UR: ABNORMAL
CREAT SERPL-MCNC: 0.86 MG/DL (ref 0.55–1.02)
EPITH CASTS URNS QL MICRO: ABNORMAL /LPF
ERYTHROCYTE [DISTWIDTH] IN BLOOD BY AUTOMATED COUNT: 12.9 % (ref 11.5–14.5)
GLOBULIN SER CALC-MCNC: 2.6 G/DL (ref 2–4)
GLUCOSE SERPL-MCNC: 89 MG/DL (ref 65–100)
GLUCOSE UR STRIP.AUTO-MCNC: NEGATIVE MG/DL
HCT VFR BLD AUTO: 47.7 % (ref 35–47)
HDLC SERPL-MCNC: 50 MG/DL
HDLC SERPL: 3.2 {RATIO} (ref 0–5)
HGB BLD-MCNC: 15.4 G/DL (ref 11.5–16)
HGB UR QL STRIP: ABNORMAL
HYALINE CASTS URNS QL MICRO: ABNORMAL /LPF (ref 0–5)
KETONES UR QL STRIP.AUTO: NEGATIVE MG/DL
LDLC SERPL CALC-MCNC: 90.2 MG/DL (ref 0–100)
LEUKOCYTE ESTERASE UR QL STRIP.AUTO: ABNORMAL
MCH RBC QN AUTO: 32 PG (ref 26–34)
MCHC RBC AUTO-ENTMCNC: 32.3 G/DL (ref 30–36.5)
MCV RBC AUTO: 99 FL (ref 80–99)
NITRITE UR QL STRIP.AUTO: POSITIVE
NRBC # BLD: 0 K/UL (ref 0–0.01)
NRBC BLD-RTO: 0 PER 100 WBC
PH UR STRIP: 7 [PH] (ref 5–8)
PLATELET # BLD AUTO: 288 K/UL (ref 150–400)
PMV BLD AUTO: 9.4 FL (ref 8.9–12.9)
POTASSIUM SERPL-SCNC: 4.2 MMOL/L (ref 3.5–5.1)
PROT SERPL-MCNC: 6.5 G/DL (ref 6.4–8.2)
PROT UR STRIP-MCNC: NEGATIVE MG/DL
RBC # BLD AUTO: 4.82 M/UL (ref 3.8–5.2)
RBC #/AREA URNS HPF: ABNORMAL /HPF (ref 0–5)
SODIUM SERPL-SCNC: 142 MMOL/L (ref 136–145)
SP GR UR REFRACTOMETRY: 1.01 (ref 1–1.03)
TRIGL SERPL-MCNC: 104 MG/DL (ref ?–150)
TSH SERPL DL<=0.05 MIU/L-ACNC: 1.37 UIU/ML (ref 0.36–3.74)
UA: UC IF INDICATED,UAUC: ABNORMAL
UROBILINOGEN UR QL STRIP.AUTO: 0.2 EU/DL (ref 0.2–1)
VLDLC SERPL CALC-MCNC: 20.8 MG/DL
WBC # BLD AUTO: 6.6 K/UL (ref 3.6–11)
WBC URNS QL MICRO: ABNORMAL /HPF (ref 0–4)

## 2022-11-15 ENCOUNTER — OFFICE VISIT (OUTPATIENT)
Dept: INTERNAL MEDICINE CLINIC | Age: 62
End: 2022-11-15
Payer: COMMERCIAL

## 2022-11-15 VITALS
SYSTOLIC BLOOD PRESSURE: 138 MMHG | RESPIRATION RATE: 16 BRPM | HEIGHT: 60 IN | HEART RATE: 73 BPM | WEIGHT: 116 LBS | BODY MASS INDEX: 22.78 KG/M2 | OXYGEN SATURATION: 99 % | DIASTOLIC BLOOD PRESSURE: 78 MMHG | TEMPERATURE: 98.2 F

## 2022-11-15 DIAGNOSIS — I10 HYPERTENSION, UNSPECIFIED TYPE: ICD-10-CM

## 2022-11-15 DIAGNOSIS — D35.00 ADRENAL ADENOMA, UNSPECIFIED LATERALITY: ICD-10-CM

## 2022-11-15 DIAGNOSIS — Z00.00 ROUTINE GENERAL MEDICAL EXAMINATION AT A HEALTH CARE FACILITY: Primary | ICD-10-CM

## 2022-11-15 DIAGNOSIS — R91.1 LUNG NODULE: ICD-10-CM

## 2022-11-15 DIAGNOSIS — E78.5 HYPERLIPIDEMIA, UNSPECIFIED HYPERLIPIDEMIA TYPE: ICD-10-CM

## 2022-11-15 PROCEDURE — 99396 PREV VISIT EST AGE 40-64: CPT | Performed by: INTERNAL MEDICINE

## 2022-11-15 RX ORDER — FLUTICASONE PROPIONATE 50 MCG
2 SPRAY, SUSPENSION (ML) NASAL DAILY
Qty: 2 EACH | Refills: 5 | Status: SHIPPED | OUTPATIENT
Start: 2022-11-15

## 2022-11-15 RX ORDER — HYDROMORPHONE HYDROCHLORIDE 2 MG/1
TABLET ORAL
COMMUNITY
Start: 2022-11-04

## 2022-11-15 RX ORDER — ATORVASTATIN CALCIUM 40 MG/1
40 TABLET, FILM COATED ORAL DAILY
Qty: 90 TABLET | Refills: 3 | Status: SHIPPED | OUTPATIENT
Start: 2022-11-15

## 2022-11-15 NOTE — PROGRESS NOTES
Rm    Chief Complaint   Patient presents with    Complete Physical        Visit Vitals  /78 (BP 1 Location: Right upper arm, BP Patient Position: Sitting, BP Cuff Size: Adult)   Pulse 73   Temp 98.2 °F (36.8 °C) (Temporal)   Resp 16   Ht 5' (1.524 m)   Wt 116 lb (52.6 kg)   SpO2 99%   BMI 22.65 kg/m²        1. Have you been to the ER, urgent care clinic since your last visit? Hospitalized since your last visit? No    2. Have you seen or consulted any other health care providers outside of the [address] [place] [address] since your last visit? Include any pap smears or colon screening. No     Health Maintenance Due   Topic Date Due    Shingrix Vaccine Age 49> (1 of 2) Never done    COVID-19 Vaccine (3 - Booster for Pfizer series) 10/09/2021    Flu Vaccine (1) Never done    Depression Screen  11/15/2022        3 most recent PHQ Screens 11/15/2022   Little interest or pleasure in doing things Not at all   Feeling down, depressed, irritable, or hopeless Not at all   Total Score PHQ 2 0        No flowsheet data found.     Learning Assessment 5/20/2014   PRIMARY LEARNER Patient   HIGHEST LEVEL OF EDUCATION - PRIMARY LEARNER  GRADUATED HIGH SCHOOL OR GED   BARRIERS PRIMARY LEARNER NONE   CO-LEARNER CAREGIVER No   PRIMARY LANGUAGE ENGLISH   LEARNER PREFERENCE PRIMARY READING   ANSWERED BY patient   RELATIONSHIP SELF

## 2022-11-15 NOTE — PROGRESS NOTES
HISTORY OF PRESENT ILLNESS  Giovanni Sosa is a 58 y.o. female. HPI  Fu HTN HLD smoker,. Osteoporosis  4mm RLL lesion, left adrenal adenoma and CPE  Sees Dr Geraldine Vogt for lung nodule and Dr Cb Owens -stones, adrenal adenoma  CT scheduled to fu kidney stones and adrenal lesion  Having frequent kidney stone pain last 1-2 years  Seeing Dr Smiley perez for hormonal solares of adrenal lesion  Stopped tobacco in March  No recent etoh  Gyn Dr Nicole Diaz for osteopenia  Had moderate coronary calcium on CT chest last year  She exercises on the treadmill-no cp or sob  Last OV  Had recent dexa--T-2.9 fem neck T-1.9 total hip and T -2.0 spine. Last Vit d 27  Tongue turns dark with red wine or coffee  Has sinus congestion and takes steroid nasal spray which seems to make her cough     C/o dry cough at night and mucous in morning--for several months  Tobacco 1/2 ppd a 40 yrs  etoh 5-7 drinks per week    Patient Active Problem List    Diagnosis Date Noted    Osteoporosis 02/25/2020    Cigarette smoker 10/19/2018    Essential hypertension, benign 03/24/2010    Pure hypercholesterolemia 03/24/2010    Kidney stones 03/24/2010     Current Outpatient Medications   Medication Sig Dispense Refill    fluticasone propionate (FLONASE) 50 mcg/actuation nasal spray 2 Sprays by Both Nostrils route daily. 1 Each 5    atorvastatin (LIPITOR) 20 mg tablet Take 1 Tablet by mouth daily. 90 Tablet 3    lisinopriL (PRINIVIL, ZESTRIL) 20 mg tablet Take 1 Tablet by mouth daily.  90 Tablet 3    HYDROmorphone (DILAUDID) 2 mg tablet        Allergies   Allergen Reactions    Contrast Dye [Iodine] Hives    Pcn [Penicillins] Hives      Lab Results   Component Value Date/Time    WBC 6.6 11/10/2022 08:07 AM    HGB 15.4 11/10/2022 08:07 AM    HCT 47.7 (H) 11/10/2022 08:07 AM    PLATELET 591 00/52/1735 08:07 AM    MCV 99.0 11/10/2022 08:07 AM     Lab Results   Component Value Date/Time    Glucose 89 11/10/2022 08:07 AM    LDL, calculated 90.2 11/10/2022 08:07 AM Creatinine 0.86 11/10/2022 08:07 AM      Lab Results   Component Value Date/Time    Cholesterol, total 161 11/10/2022 08:07 AM    HDL Cholesterol 50 11/10/2022 08:07 AM    LDL, calculated 90.2 11/10/2022 08:07 AM    Triglyceride 104 11/10/2022 08:07 AM    CHOL/HDL Ratio 3.2 11/10/2022 08:07 AM     Lab Results   Component Value Date/Time    GFR est non-AA 68 05/28/2021 08:06 AM    GFR est AA 78 05/28/2021 08:06 AM    Creatinine 0.86 11/10/2022 08:07 AM    BUN 15 11/10/2022 08:07 AM    Sodium 142 11/10/2022 08:07 AM    Potassium 4.2 11/10/2022 08:07 AM    Chloride 108 11/10/2022 08:07 AM    CO2 31 11/10/2022 08:07 AM     Lab Results   Component Value Date/Time    TSH 1.37 11/10/2022 08:07 AM         Review of Systems   Constitutional:  Negative for chills, fever, malaise/fatigue and weight loss. HENT: Negative. Eyes:  Negative for blurred vision and double vision. Respiratory:  Negative for cough and shortness of breath. Cardiovascular:  Negative for chest pain and palpitations. Gastrointestinal:  Negative for abdominal pain, blood in stool, constipation, diarrhea, melena, nausea and vomiting. Genitourinary:  Negative for dysuria, frequency, hematuria and urgency. Musculoskeletal:  Negative for back pain, falls, joint pain and myalgias. Skin: Negative. Neurological:  Negative for dizziness, tremors and headaches. Physical Exam  Vitals and nursing note reviewed. Constitutional:       Appearance: Normal appearance. She is well-developed. Comments: Appears stated age   HENT:      Right Ear: Tympanic membrane normal.      Left Ear: Tympanic membrane normal.   Cardiovascular:      Rate and Rhythm: Normal rate and regular rhythm. Heart sounds: Normal heart sounds. No murmur heard. No friction rub. No gallop. Pulmonary:      Effort: Pulmonary effort is normal. No respiratory distress. Breath sounds: Normal breath sounds. No wheezing.    Abdominal:      General: Bowel sounds are normal.      Palpations: Abdomen is soft. Musculoskeletal:      Cervical back: Normal range of motion. Lymphadenopathy:      Cervical: No cervical adenopathy. Skin:     General: Skin is warm. Neurological:      Mental Status: She is alert. ASSESSMENT and PLAN    ICD-10-CM ICD-9-CM    1. Routine general medical examination at a health care facility  Z00.00 V70.0 Reviewed recent labs  Recommend flu , PCV and shingrix  UTD well woman care      2. Hypertension, unspecified type  I10 401.9 controlled      3. Hyperlipidemia, unspecified hyperlipidemia type  E78.5 272.4 Increase lipitor 40 mg every day--coronary artery calcium on CT      4. Lung nodule  R91.1 793.11 4 mm0fy Pulemily MERCADO -CT next month      5.  Adrenal adenoma, unspecified laterality  D35.00 227.0 Fu URO and Endo   Rtc 1 year CPE

## 2022-12-05 ENCOUNTER — HOSPITAL ENCOUNTER (OUTPATIENT)
Dept: CT IMAGING | Age: 62
Discharge: HOME | End: 2022-12-05
Attending: INTERNAL MEDICINE
Payer: COMMERCIAL

## 2022-12-05 ENCOUNTER — HOSPITAL ENCOUNTER (OUTPATIENT)
Dept: CT IMAGING | Age: 62
Discharge: HOME | End: 2022-12-05
Attending: UROLOGY
Payer: COMMERCIAL

## 2022-12-05 DIAGNOSIS — N20.1 CALCULUS OF URETER: ICD-10-CM

## 2022-12-05 DIAGNOSIS — R91.1 LUNG NODULE, SOLITARY: ICD-10-CM

## 2022-12-05 PROCEDURE — 74011000636 HC RX REV CODE- 636: Performed by: UROLOGY

## 2022-12-05 PROCEDURE — 74178 CT ABD&PLV WO CNTR FLWD CNTR: CPT

## 2022-12-05 PROCEDURE — 71271 CT THORAX LUNG CANCER SCR C-: CPT

## 2022-12-05 RX ADMIN — IOPAMIDOL 100 ML: 755 INJECTION, SOLUTION INTRAVENOUS at 08:36

## 2022-12-13 ENCOUNTER — TRANSCRIBE ORDER (OUTPATIENT)
Dept: SCHEDULING | Age: 62
End: 2022-12-13

## 2022-12-13 DIAGNOSIS — R91.1 LUNG NODULE, SOLITARY: Primary | ICD-10-CM

## 2023-01-19 ENCOUNTER — HOSPITAL ENCOUNTER (OUTPATIENT)
Dept: INFUSION THERAPY | Age: 63
End: 2023-01-19

## 2023-01-20 ENCOUNTER — HOSPITAL ENCOUNTER (OUTPATIENT)
Dept: INFUSION THERAPY | Age: 63
Discharge: HOME | End: 2023-01-20
Payer: COMMERCIAL

## 2023-01-20 VITALS
RESPIRATION RATE: 16 BRPM | SYSTOLIC BLOOD PRESSURE: 123 MMHG | HEART RATE: 91 BPM | TEMPERATURE: 98.1 F | WEIGHT: 112.9 LBS | OXYGEN SATURATION: 100 % | BODY MASS INDEX: 22.05 KG/M2 | DIASTOLIC BLOOD PRESSURE: 74 MMHG

## 2023-01-20 LAB
ANION GAP BLD CALC-SCNC: 10 MMOL/L (ref 10–20)
CA-I BLD-MCNC: 1.23 MMOL/L (ref 1.12–1.32)
CHLORIDE BLD-SCNC: 106 MMOL/L (ref 98–107)
CO2 BLD-SCNC: 28.4 MMOL/L (ref 21–32)
CREAT BLD-MCNC: 0.98 MG/DL (ref 0.6–1.3)
GLUCOSE BLD-MCNC: 78 MG/DL (ref 65–100)
MAGNESIUM SERPL-MCNC: 2.2 MG/DL (ref 1.6–2.4)
PHOSPHATE SERPL-MCNC: 4.2 MG/DL (ref 2.6–4.7)
POTASSIUM BLD-SCNC: 4.5 MMOL/L (ref 3.5–5.1)
SERVICE CMNT-IMP: NORMAL
SODIUM BLD-SCNC: 143 MMOL/L (ref 136–145)

## 2023-01-20 PROCEDURE — 36415 COLL VENOUS BLD VENIPUNCTURE: CPT

## 2023-01-20 PROCEDURE — 80047 BASIC METABLC PNL IONIZED CA: CPT

## 2023-01-20 PROCEDURE — 96372 THER/PROPH/DIAG INJ SC/IM: CPT

## 2023-01-20 PROCEDURE — 84100 ASSAY OF PHOSPHORUS: CPT

## 2023-01-20 PROCEDURE — 83735 ASSAY OF MAGNESIUM: CPT

## 2023-01-20 PROCEDURE — 74011250636 HC RX REV CODE- 250/636: Performed by: INTERNAL MEDICINE

## 2023-01-20 RX ADMIN — DENOSUMAB 60 MG: 60 INJECTION SUBCUTANEOUS at 16:05

## 2023-01-20 NOTE — PROGRESS NOTES
[facility] Visit Note:  Arrived - 1550    Visit Vitals  /74   Pulse 91   Temp 98.1 °F (36.7 °C)   Resp 16   Wt 51.2 kg (112 lb 14.4 oz)   SpO2 100%   Breastfeeding No   BMI 22.05 kg/m²       Assessment - unremarkable. Reviewed Prolia info and provided patient with brochure. Pt denies any recent or upcoming dental work. Labs obtained: Chem8, Mag, Phos  Recent Results (from the past 12 hour(s))   POC CHEM8    Collection Time: 01/20/23  3:55 PM   Result Value Ref Range    Calcium, ionized (POC) 1.23 1.12 - 1.32 mmol/L    Sodium (POC) 143 136 - 145 mmol/L    Potassium (POC) 4.5 3.5 - 5.1 mmol/L    Chloride (POC) 106 98 - 107 mmol/L    CO2 (POC) 28.4 21 - 32 mmol/L    Anion gap (POC) 10 10 - 20 mmol/L    Glucose (POC) 78 65 - 100 mg/dL    Creatinine (POC) 0.98 0.6 - 1.3 mg/dL    eGFR (POC) >60 >60 ml/min/1.73m2    Comment Comment Not Indicated. Medication given:  Prolia 60mg SQ in left arm    1605 - Tolerated well. No reaction noted. Reviewed Prolia D/C instructions. Verbalized understanding. Pt denies any acute problems/changes. Discharged from [facility] ambulatory. No distress.  Next appt: 7/21/23

## 2023-03-10 DIAGNOSIS — I10 ESSENTIAL HYPERTENSION, BENIGN: ICD-10-CM

## 2023-03-10 RX ORDER — LISINOPRIL 20 MG/1
TABLET ORAL
Qty: 90 TABLET | Refills: 3 | Status: SHIPPED | OUTPATIENT
Start: 2023-03-10

## 2023-07-03 DIAGNOSIS — M81.0 OSTEOPOROSIS, UNSPECIFIED OSTEOPOROSIS TYPE, UNSPECIFIED PATHOLOGICAL FRACTURE PRESENCE: Primary | ICD-10-CM

## 2023-07-03 RX ORDER — ONDANSETRON 2 MG/ML
8 INJECTION INTRAMUSCULAR; INTRAVENOUS
OUTPATIENT
Start: 2023-07-21

## 2023-07-03 RX ORDER — EPINEPHRINE 1 MG/ML
0.3 INJECTION, SOLUTION, CONCENTRATE INTRAVENOUS PRN
OUTPATIENT
Start: 2023-07-21

## 2023-07-03 RX ORDER — ACETAMINOPHEN 325 MG/1
650 TABLET ORAL
OUTPATIENT
Start: 2023-07-21

## 2023-07-03 RX ORDER — SODIUM CHLORIDE 9 MG/ML
INJECTION, SOLUTION INTRAVENOUS CONTINUOUS
OUTPATIENT
Start: 2023-07-21

## 2023-07-03 RX ORDER — FAMOTIDINE 10 MG/ML
20 INJECTION, SOLUTION INTRAVENOUS
OUTPATIENT
Start: 2023-07-21

## 2023-07-03 RX ORDER — ALBUTEROL SULFATE 90 UG/1
4 AEROSOL, METERED RESPIRATORY (INHALATION) PRN
OUTPATIENT
Start: 2023-07-21

## 2023-07-03 RX ORDER — DIPHENHYDRAMINE HYDROCHLORIDE 50 MG/ML
50 INJECTION INTRAMUSCULAR; INTRAVENOUS
OUTPATIENT
Start: 2023-07-21

## 2023-07-21 ENCOUNTER — HOSPITAL ENCOUNTER (OUTPATIENT)
Facility: HOSPITAL | Age: 63
Setting detail: INFUSION SERIES
End: 2023-07-21

## 2023-07-21 ENCOUNTER — APPOINTMENT (OUTPATIENT)
Dept: INFUSION THERAPY | Age: 63
End: 2023-07-21

## 2023-07-24 ENCOUNTER — HOSPITAL ENCOUNTER (OUTPATIENT)
Facility: HOSPITAL | Age: 63
Setting detail: INFUSION SERIES
End: 2023-07-24
Payer: COMMERCIAL

## 2023-07-24 VITALS
WEIGHT: 115.4 LBS | HEIGHT: 60 IN | SYSTOLIC BLOOD PRESSURE: 146 MMHG | RESPIRATION RATE: 16 BRPM | OXYGEN SATURATION: 95 % | DIASTOLIC BLOOD PRESSURE: 84 MMHG | TEMPERATURE: 97.7 F | BODY MASS INDEX: 22.65 KG/M2 | HEART RATE: 85 BPM

## 2023-07-24 DIAGNOSIS — M81.0 OSTEOPOROSIS, UNSPECIFIED OSTEOPOROSIS TYPE, UNSPECIFIED PATHOLOGICAL FRACTURE PRESENCE: Primary | ICD-10-CM

## 2023-07-24 LAB
ANION GAP BLD CALC-SCNC: 11 MMOL/L (ref 10–20)
CA-I BLD-MCNC: 1.19 MMOL/L (ref 1.12–1.32)
CHLORIDE BLD-SCNC: 105 MMOL/L (ref 98–107)
CO2 BLD-SCNC: 26.7 MMOL/L (ref 21–32)
CREAT BLD-MCNC: 0.86 MG/DL (ref 0.6–1.3)
GLUCOSE BLD-MCNC: 79 MG/DL (ref 65–100)
MAGNESIUM SERPL-MCNC: 2 MG/DL (ref 1.6–2.4)
PHOSPHATE SERPL-MCNC: 4.6 MG/DL (ref 2.6–4.7)
POTASSIUM BLD-SCNC: 4.3 MMOL/L (ref 3.5–5.1)
SERVICE CMNT-IMP: NORMAL
SODIUM BLD-SCNC: 142 MMOL/L (ref 136–145)

## 2023-07-24 PROCEDURE — 6360000002 HC RX W HCPCS: Performed by: INTERNAL MEDICINE

## 2023-07-24 PROCEDURE — 83735 ASSAY OF MAGNESIUM: CPT

## 2023-07-24 PROCEDURE — 96372 THER/PROPH/DIAG INJ SC/IM: CPT

## 2023-07-24 PROCEDURE — 84100 ASSAY OF PHOSPHORUS: CPT

## 2023-07-24 PROCEDURE — 36415 COLL VENOUS BLD VENIPUNCTURE: CPT

## 2023-07-24 PROCEDURE — 80047 BASIC METABLC PNL IONIZED CA: CPT

## 2023-07-24 RX ORDER — ALBUTEROL SULFATE 90 UG/1
4 AEROSOL, METERED RESPIRATORY (INHALATION) PRN
OUTPATIENT
Start: 2024-01-21

## 2023-07-24 RX ORDER — SODIUM CHLORIDE 9 MG/ML
INJECTION, SOLUTION INTRAVENOUS CONTINUOUS
OUTPATIENT
Start: 2024-01-21

## 2023-07-24 RX ORDER — EPINEPHRINE 1 MG/ML
0.3 INJECTION, SOLUTION, CONCENTRATE INTRAVENOUS PRN
OUTPATIENT
Start: 2024-01-21

## 2023-07-24 RX ORDER — ONDANSETRON 2 MG/ML
8 INJECTION INTRAMUSCULAR; INTRAVENOUS
OUTPATIENT
Start: 2024-01-21

## 2023-07-24 RX ORDER — DIPHENHYDRAMINE HYDROCHLORIDE 50 MG/ML
50 INJECTION INTRAMUSCULAR; INTRAVENOUS
OUTPATIENT
Start: 2024-01-21

## 2023-07-24 RX ORDER — ACETAMINOPHEN 325 MG/1
650 TABLET ORAL
OUTPATIENT
Start: 2024-01-21

## 2023-07-24 RX ADMIN — DENOSUMAB 60 MG: 60 INJECTION SUBCUTANEOUS at 16:24

## 2023-11-17 LAB — HBA1C MFR BLD HPLC: 5.5 %

## 2023-11-21 ENCOUNTER — OFFICE VISIT (OUTPATIENT)
Age: 63
End: 2023-11-21
Payer: COMMERCIAL

## 2023-11-21 VITALS
DIASTOLIC BLOOD PRESSURE: 70 MMHG | RESPIRATION RATE: 16 BRPM | WEIGHT: 118.2 LBS | BODY MASS INDEX: 23.2 KG/M2 | HEIGHT: 60 IN | OXYGEN SATURATION: 99 % | TEMPERATURE: 97.1 F | HEART RATE: 88 BPM | SYSTOLIC BLOOD PRESSURE: 100 MMHG

## 2023-11-21 DIAGNOSIS — E55.9 VITAMIN D DEFICIENCY: ICD-10-CM

## 2023-11-21 DIAGNOSIS — Z00.00 ROUTINE PHYSICAL EXAMINATION: Primary | ICD-10-CM

## 2023-11-21 DIAGNOSIS — R91.1 LUNG NODULE: ICD-10-CM

## 2023-11-21 DIAGNOSIS — I10 HYPERTENSION, UNSPECIFIED TYPE: ICD-10-CM

## 2023-11-21 DIAGNOSIS — N20.0 KIDNEY STONES: ICD-10-CM

## 2023-11-21 DIAGNOSIS — E78.5 HYPERLIPIDEMIA, UNSPECIFIED HYPERLIPIDEMIA TYPE: ICD-10-CM

## 2023-11-21 DIAGNOSIS — M81.0 OSTEOPOROSIS, UNSPECIFIED OSTEOPOROSIS TYPE, UNSPECIFIED PATHOLOGICAL FRACTURE PRESENCE: ICD-10-CM

## 2023-11-21 DIAGNOSIS — M85.80 OSTEOPENIA, UNSPECIFIED LOCATION: ICD-10-CM

## 2023-11-21 DIAGNOSIS — E53.8 VITAMIN B12 DEFICIENCY: ICD-10-CM

## 2023-11-21 PROCEDURE — 3074F SYST BP LT 130 MM HG: CPT | Performed by: INTERNAL MEDICINE

## 2023-11-21 PROCEDURE — 96372 THER/PROPH/DIAG INJ SC/IM: CPT | Performed by: INTERNAL MEDICINE

## 2023-11-21 PROCEDURE — 99396 PREV VISIT EST AGE 40-64: CPT | Performed by: INTERNAL MEDICINE

## 2023-11-21 PROCEDURE — 3078F DIAST BP <80 MM HG: CPT | Performed by: INTERNAL MEDICINE

## 2023-11-21 RX ORDER — CYANOCOBALAMIN 1000 UG/ML
1000 INJECTION, SOLUTION INTRAMUSCULAR; SUBCUTANEOUS ONCE
Status: COMPLETED | OUTPATIENT
Start: 2023-11-21 | End: 2023-11-21

## 2023-11-21 RX ORDER — LISINOPRIL 20 MG/1
20 TABLET ORAL DAILY
Qty: 90 TABLET | Refills: 3 | Status: SHIPPED | OUTPATIENT
Start: 2023-11-21

## 2023-11-21 RX ORDER — ATORVASTATIN CALCIUM 40 MG/1
40 TABLET, FILM COATED ORAL DAILY
Qty: 90 TABLET | Refills: 3 | Status: SHIPPED | OUTPATIENT
Start: 2023-11-21

## 2023-11-21 RX ORDER — DICLOFENAC SODIUM 75 MG/1
75 TABLET, DELAYED RELEASE ORAL 2 TIMES DAILY
COMMUNITY
Start: 2019-09-20

## 2023-11-21 RX ORDER — CYANOCOBALAMIN 1000 UG/ML
1000 INJECTION, SOLUTION INTRAMUSCULAR; SUBCUTANEOUS
COMMUNITY
Start: 2023-11-20

## 2023-11-21 RX ORDER — PRAVASTATIN SODIUM 10 MG
1 TABLET ORAL NIGHTLY
COMMUNITY
Start: 2017-03-30 | End: 2023-11-21

## 2023-11-21 RX ADMIN — CYANOCOBALAMIN 1000 MCG: 1000 INJECTION, SOLUTION INTRAMUSCULAR; SUBCUTANEOUS at 15:07

## 2023-11-21 SDOH — ECONOMIC STABILITY: HOUSING INSECURITY
IN THE LAST 12 MONTHS, WAS THERE A TIME WHEN YOU DID NOT HAVE A STEADY PLACE TO SLEEP OR SLEPT IN A SHELTER (INCLUDING NOW)?: NO

## 2023-11-21 SDOH — ECONOMIC STABILITY: INCOME INSECURITY: HOW HARD IS IT FOR YOU TO PAY FOR THE VERY BASICS LIKE FOOD, HOUSING, MEDICAL CARE, AND HEATING?: NOT HARD AT ALL

## 2023-11-21 SDOH — ECONOMIC STABILITY: FOOD INSECURITY: WITHIN THE PAST 12 MONTHS, YOU WORRIED THAT YOUR FOOD WOULD RUN OUT BEFORE YOU GOT MONEY TO BUY MORE.: NEVER TRUE

## 2023-11-21 SDOH — ECONOMIC STABILITY: FOOD INSECURITY: WITHIN THE PAST 12 MONTHS, THE FOOD YOU BOUGHT JUST DIDN'T LAST AND YOU DIDN'T HAVE MONEY TO GET MORE.: NEVER TRUE

## 2023-11-21 ASSESSMENT — PATIENT HEALTH QUESTIONNAIRE - PHQ9
2. FEELING DOWN, DEPRESSED OR HOPELESS: 0
SUM OF ALL RESPONSES TO PHQ QUESTIONS 1-9: 0
SUM OF ALL RESPONSES TO PHQ QUESTIONS 1-9: 0
1. LITTLE INTEREST OR PLEASURE IN DOING THINGS: 0
SUM OF ALL RESPONSES TO PHQ9 QUESTIONS 1 & 2: 0
SUM OF ALL RESPONSES TO PHQ QUESTIONS 1-9: 0
SUM OF ALL RESPONSES TO PHQ QUESTIONS 1-9: 0

## 2023-11-21 ASSESSMENT — ENCOUNTER SYMPTOMS
EYES NEGATIVE: 1
GASTROINTESTINAL NEGATIVE: 1
RESPIRATORY NEGATIVE: 1
ALLERGIC/IMMUNOLOGIC NEGATIVE: 1

## 2023-11-21 NOTE — PROGRESS NOTES
1. \"Have you been to the ER, urgent care clinic since your last visit? Hospitalized since your last visit? \" No    2. \"Have you seen or consulted any other health care providers outside of the [address] [place] [address] since your last visit? \"         Dr. Maty Goodrich // urology     3. For patients aged 43-73: Has the patient had a colonoscopy / FIT/ Cologuard? No      If the patient is female:    4. For patients aged 43-66: Has the patient had a mammogram within the past 2 years? Scheduled 2024      5. For patients aged 21-65: Has the patient had a pap smear?   Scheduled 12/2023

## 2023-11-21 NOTE — PROGRESS NOTES
After obtaining consent, and per  verbal orders of Ricardo Grubbs MD, injection of B12 given in Left deltoid by García Saavedra LPN. Patient instructed to remain in clinic for 20 minutes afterwards, and to report any adverse reaction to me immediately.

## 2023-11-21 NOTE — PROGRESS NOTES
HISTORY OF PRESENT ILLNESS   Matthew Roman   is a 61 y.o.  female. Fu HTN HLD former smoker,. Osteoporosis  4mm RLL lesion, left adrenal adenoma  kidney stones, b12 defic  and CPE    On prolia for osteoporosis    PULM Dr Na Estrella for 1 more CT in 1-2 months  Endocrine Dr Ralph Robison stones caclium oxolatete and adrenal adenoma stable by Calixto Becerra exams    Goes to gym, works in her yard etc  Feels well overall    Sees DERM annually    Last Lonnie Jeremy Min for lung nodule and Dr Casi Huynh -stones, adrenal adenoma  CT scheduled to fu kidney stones and adrenal lesion  Having frequent kidney stone pain last 1-2 years  Seeing Dr Fabian ibarra for hormonal boyle of adrenal lesion  Stopped tobacco in March  No recent etoh  Gyn Dr Duran Stevens for osteopenia  Had moderate coronary calcium on CT chest last year  She exercises on the treadmill-no cp or sob  Patient Active Problem List    Diagnosis Date Noted    Osteoporosis 02/25/2020    Cigarette smoker 10/19/2018    Pure hypercholesterolemia 03/24/2010    Essential hypertension, benign 03/24/2010    Kidney stones 03/24/2010     Current Outpatient Medications   Medication Sig Dispense Refill    diclofenac (VOLTAREN) 75 MG EC tablet Take 1 tablet by mouth 2 times daily prn      pravastatin (PRAVACHOL) 10 MG tablet Take 1 tablet by mouth nightly      cyanocobalamin 1000 MCG/ML injection 1 mL      atorvastatin (LIPITOR) 40 MG tablet Take 1 tablet by mouth daily      fluticasone (FLONASE) 50 MCG/ACT nasal spray 2 sprays by Nasal route daily      lisinopril (PRINIVIL;ZESTRIL) 20 MG tablet TAKE 1 TABLET BY MOUTH ONE TIME A DAY      HYDROmorphone (DILAUDID) 2 MG tablet ceived the following from [facility]: Outside name: HYDROmorphone (DILAUDID) 2 mg tablet       No current facility-administered medications for this visit.      Allergies   Allergen Reactions    Iodine Hives    Penicillins Hives     Social History     Tobacco

## 2024-01-22 ENCOUNTER — HOSPITAL ENCOUNTER (OUTPATIENT)
Facility: HOSPITAL | Age: 64
Setting detail: INFUSION SERIES
End: 2024-01-22

## 2024-01-24 ENCOUNTER — TELEPHONE (OUTPATIENT)
Age: 64
End: 2024-01-24

## 2024-01-24 NOTE — TELEPHONE ENCOUNTER
Jennie states they need a call on Prior Auth for Prolia.     Please call back at [phone]  Ref#  [id number]    Thank you

## 2024-01-29 DIAGNOSIS — M81.0 OSTEOPOROSIS, UNSPECIFIED OSTEOPOROSIS TYPE, UNSPECIFIED PATHOLOGICAL FRACTURE PRESENCE: Primary | ICD-10-CM

## 2024-01-30 ENCOUNTER — HOSPITAL ENCOUNTER (OUTPATIENT)
Facility: HOSPITAL | Age: 64
Setting detail: INFUSION SERIES
End: 2024-01-30

## 2024-02-06 ENCOUNTER — TELEPHONE (OUTPATIENT)
Age: 64
End: 2024-02-06

## 2024-02-06 NOTE — TELEPHONE ENCOUNTER
PA for prolia injections denied  PCP is recommending patient call Dr. Valencia, rhuematology to set up injections there.    LVM for patient  Sent [employer] message

## 2024-02-06 NOTE — TELEPHONE ENCOUNTER
Spoke with patient, discussed response from PCP regarding Prolia     Call placed to Dr. Valencia office, left message for clinical director to see what information is needed to set up Prolia injections.

## 2024-02-14 ENCOUNTER — HOSPITAL ENCOUNTER (OUTPATIENT)
Facility: HOSPITAL | Age: 64
Setting detail: SPECIMEN
Discharge: HOME | End: 2024-02-17

## 2024-02-14 DIAGNOSIS — M81.0 OSTEOPOROSIS, UNSPECIFIED OSTEOPOROSIS TYPE, UNSPECIFIED PATHOLOGICAL FRACTURE PRESENCE: ICD-10-CM

## 2024-02-14 DIAGNOSIS — E78.5 HYPERLIPIDEMIA, UNSPECIFIED HYPERLIPIDEMIA TYPE: ICD-10-CM

## 2024-02-15 LAB
CHOLEST SERPL-MCNC: 167 MG/DL
HDLC SERPL-MCNC: 62 MG/DL
HDLC SERPL: 2.7 (ref 0–5)
LDLC SERPL CALC-MCNC: 89.2 MG/DL (ref 0–100)
TRIGL SERPL-MCNC: 79 MG/DL
VLDLC SERPL CALC-MCNC: 15.8 MG/DL

## 2024-02-16 RX ORDER — EZETIMIBE 10 MG/1
10 TABLET ORAL DAILY
Qty: 90 TABLET | Refills: 3 | Status: SHIPPED | OUTPATIENT
Start: 2024-02-16

## 2024-02-19 ENCOUNTER — TELEPHONE (OUTPATIENT)
Age: 64
End: 2024-02-19

## 2024-02-19 NOTE — TELEPHONE ENCOUNTER
Patient notified of response from Cas Gonzales MD    Tell pt lipids reasoably well controlled on lipitor 40 mg but due to coronary calcium on CT would like to get LDL < 70 -currently 89-- I sent in zetia 10 mg to take with lipitor. The zetia will limit cholesterol absorption from diet in the small intestine and should help lower the LDL to goal     States understanding

## 2025-02-12 RX ORDER — EZETIMIBE 10 MG/1
10 TABLET ORAL DAILY
Qty: 90 TABLET | Refills: 3 | Status: SHIPPED | OUTPATIENT
Start: 2025-02-12 | End: 2025-02-12 | Stop reason: SDUPTHER

## 2025-02-12 RX ORDER — ATORVASTATIN CALCIUM 40 MG/1
40 TABLET, FILM COATED ORAL DAILY
Qty: 90 TABLET | Refills: 3 | Status: CANCELLED | OUTPATIENT
Start: 2025-02-12

## 2025-02-12 RX ORDER — LISINOPRIL 20 MG/1
20 TABLET ORAL DAILY
Qty: 90 TABLET | Refills: 3 | Status: CANCELLED | OUTPATIENT
Start: 2025-02-12

## 2025-02-12 RX ORDER — ATORVASTATIN CALCIUM 40 MG/1
40 TABLET, FILM COATED ORAL DAILY
Qty: 90 TABLET | Refills: 3 | Status: SHIPPED | OUTPATIENT
Start: 2025-02-12

## 2025-02-12 RX ORDER — LISINOPRIL 20 MG/1
20 TABLET ORAL DAILY
Qty: 90 TABLET | Refills: 3 | Status: SHIPPED | OUTPATIENT
Start: 2025-02-12

## 2025-02-12 RX ORDER — ATORVASTATIN CALCIUM 40 MG/1
40 TABLET, FILM COATED ORAL DAILY
Qty: 90 TABLET | Refills: 3 | Status: SHIPPED | OUTPATIENT
Start: 2025-02-12 | End: 2025-02-12 | Stop reason: SDUPTHER

## 2025-02-12 RX ORDER — EZETIMIBE 10 MG/1
10 TABLET ORAL DAILY
Qty: 90 TABLET | Refills: 3 | Status: SHIPPED | OUTPATIENT
Start: 2025-02-12

## 2025-02-12 RX ORDER — LISINOPRIL 20 MG/1
20 TABLET ORAL DAILY
Qty: 90 TABLET | Refills: 3 | Status: SHIPPED | OUTPATIENT
Start: 2025-02-12 | End: 2025-02-12 | Stop reason: SDUPTHER

## 2025-02-12 RX ORDER — EZETIMIBE 10 MG/1
10 TABLET ORAL DAILY
Qty: 90 TABLET | Refills: 3 | Status: CANCELLED | OUTPATIENT
Start: 2025-02-12

## 2025-07-23 ENCOUNTER — TELEPHONE (OUTPATIENT)
Age: 65
End: 2025-07-23

## 2025-07-23 NOTE — TELEPHONE ENCOUNTER
----- Message from Svitlana ROSADO sent at 7/23/2025 10:30 AM EDT -----  Regarding: [facility] Appointment Request  [facility] Appointment Request    Patient needs appointment for [facility] Appointment Type: Annual Visit.    Patient Requested Dates(s): as soon as possible for August  Patient Requested Time: morning or any time  Provider Name: Cas Gonzales MD      Reason for Appointment Request: Established Patient - Available appointments did not meet patient need  --------------------------------------------------------------------------------------------------------------------------    Relationship to Patient: Self     Call Back Information: OK to leave message on voicemail  Preferred Call Back Number: Phone        Note: PT have an appt on August 28,2025 for Physical

## 2025-08-23 SDOH — ECONOMIC STABILITY: INCOME INSECURITY: IN THE LAST 12 MONTHS, WAS THERE A TIME WHEN YOU WERE NOT ABLE TO PAY THE MORTGAGE OR RENT ON TIME?: NO

## 2025-08-23 SDOH — ECONOMIC STABILITY: FOOD INSECURITY: WITHIN THE PAST 12 MONTHS, YOU WORRIED THAT YOUR FOOD WOULD RUN OUT BEFORE YOU GOT MONEY TO BUY MORE.: NEVER TRUE

## 2025-08-23 SDOH — ECONOMIC STABILITY: FOOD INSECURITY: WITHIN THE PAST 12 MONTHS, THE FOOD YOU BOUGHT JUST DIDN'T LAST AND YOU DIDN'T HAVE MONEY TO GET MORE.: NEVER TRUE

## 2025-08-23 SDOH — HEALTH STABILITY: PHYSICAL HEALTH: ON AVERAGE, HOW MANY DAYS PER WEEK DO YOU ENGAGE IN MODERATE TO STRENUOUS EXERCISE (LIKE A BRISK WALK)?: 3 DAYS

## 2025-08-23 SDOH — HEALTH STABILITY: PHYSICAL HEALTH: ON AVERAGE, HOW MANY MINUTES DO YOU ENGAGE IN EXERCISE AT THIS LEVEL?: 60 MIN

## 2025-08-23 SDOH — ECONOMIC STABILITY: TRANSPORTATION INSECURITY
IN THE PAST 12 MONTHS, HAS THE LACK OF TRANSPORTATION KEPT YOU FROM MEDICAL APPOINTMENTS OR FROM GETTING MEDICATIONS?: NO

## 2025-08-23 SDOH — ECONOMIC STABILITY: TRANSPORTATION INSECURITY
IN THE PAST 12 MONTHS, HAS LACK OF TRANSPORTATION KEPT YOU FROM MEETINGS, WORK, OR FROM GETTING THINGS NEEDED FOR DAILY LIVING?: NO

## 2025-08-23 ASSESSMENT — LIFESTYLE VARIABLES
HOW OFTEN DO YOU HAVE SIX OR MORE DRINKS ON ONE OCCASION: 1
HOW OFTEN DO YOU HAVE A DRINK CONTAINING ALCOHOL: 2
HOW OFTEN DO YOU HAVE A DRINK CONTAINING ALCOHOL: MONTHLY OR LESS
HOW MANY STANDARD DRINKS CONTAINING ALCOHOL DO YOU HAVE ON A TYPICAL DAY: 1
HOW MANY STANDARD DRINKS CONTAINING ALCOHOL DO YOU HAVE ON A TYPICAL DAY: 1 OR 2

## 2025-08-23 ASSESSMENT — PATIENT HEALTH QUESTIONNAIRE - PHQ9
2. FEELING DOWN, DEPRESSED OR HOPELESS: NOT AT ALL
SUM OF ALL RESPONSES TO PHQ QUESTIONS 1-9: 0
SUM OF ALL RESPONSES TO PHQ QUESTIONS 1-9: 0
1. LITTLE INTEREST OR PLEASURE IN DOING THINGS: NOT AT ALL
SUM OF ALL RESPONSES TO PHQ QUESTIONS 1-9: 0
SUM OF ALL RESPONSES TO PHQ QUESTIONS 1-9: 0

## 2025-08-29 ENCOUNTER — OFFICE VISIT (OUTPATIENT)
Age: 65
End: 2025-08-29
Payer: MEDICARE

## 2025-08-29 VITALS
RESPIRATION RATE: 20 BRPM | BODY MASS INDEX: 22.9 KG/M2 | TEMPERATURE: 97.4 F | OXYGEN SATURATION: 98 % | WEIGHT: 113.6 LBS | HEART RATE: 64 BPM | DIASTOLIC BLOOD PRESSURE: 82 MMHG | HEIGHT: 59 IN | SYSTOLIC BLOOD PRESSURE: 130 MMHG

## 2025-08-29 DIAGNOSIS — Z00.00 WELCOME TO MEDICARE PREVENTIVE VISIT: ICD-10-CM

## 2025-08-29 DIAGNOSIS — E55.9 VITAMIN D DEFICIENCY: ICD-10-CM

## 2025-08-29 DIAGNOSIS — R22.32 MASS OF LEFT AXILLA: ICD-10-CM

## 2025-08-29 DIAGNOSIS — D35.02 ADENOMA OF LEFT ADRENAL GLAND: ICD-10-CM

## 2025-08-29 DIAGNOSIS — E53.8 B12 DEFICIENCY: ICD-10-CM

## 2025-08-29 DIAGNOSIS — Z12.11 SCREEN FOR COLON CANCER: ICD-10-CM

## 2025-08-29 DIAGNOSIS — E78.5 HYPERLIPIDEMIA, UNSPECIFIED HYPERLIPIDEMIA TYPE: ICD-10-CM

## 2025-08-29 DIAGNOSIS — M81.0 AGE RELATED OSTEOPOROSIS, UNSPECIFIED PATHOLOGICAL FRACTURE PRESENCE: ICD-10-CM

## 2025-08-29 DIAGNOSIS — Z87.891 FORMER SMOKER: ICD-10-CM

## 2025-08-29 DIAGNOSIS — I10 HYPERTENSION, UNSPECIFIED TYPE: Primary | ICD-10-CM

## 2025-08-29 PROCEDURE — 99214 OFFICE O/P EST MOD 30 MIN: CPT | Performed by: INTERNAL MEDICINE

## (undated) DEVICE — CONTAINER,SPEC,PNEUM TUBE,3OZ,STRL PATH: Brand: MEDLINE

## (undated) DEVICE — 3M™ TEGADERM™ TRANSPARENT FILM DRESSING FRAME STYLE, 1624W, 2-3/8 IN X 2-3/4 IN (6 CM X 7 CM), 100/CT 4CT/CASE: Brand: 3M™ TEGADERM™

## (undated) DEVICE — SUT ETHLN 4-0 18IN PS2 BLK --

## (undated) DEVICE — TOWEL SURG W17XL27IN STD BLU COT NONFENESTRATED PREWASHED

## (undated) DEVICE — NDL CTR 40/70 FM BLCK MAG: Brand: CARDINAL HEALTH

## (undated) DEVICE — SURGIFOAM SPNG SZ 12-7

## (undated) DEVICE — STERILE POLYISOPRENE POWDER-FREE SURGICAL GLOVES: Brand: PROTEXIS

## (undated) DEVICE — PACK,EENT,TURBAN DRAPE,PK II: Brand: MEDLINE

## (undated) DEVICE — SPONGE GZ W4XL4IN COT 12 PLY TYP VII WVN C FLD DSGN

## (undated) DEVICE — SPONGE GZ W4XL4IN COT RADPQ HIGHLY ABSRB

## (undated) DEVICE — TUBING, SUCTION, 1/4" X 10', STRAIGHT: Brand: MEDLINE

## (undated) DEVICE — INFECTION CONTROL KIT SYS

## (undated) DEVICE — SPLINT 1524050 5PK PAIR DOYLE II AIRWAY: Brand: DOYLE II ™

## (undated) DEVICE — MARKER,SKIN,WI/RULER AND LABELS: Brand: MEDLINE

## (undated) DEVICE — KIT,1200CC CANISTER,3/16"X6' TUBING: Brand: MEDLINE INDUSTRIES, INC.

## (undated) DEVICE — SOLUTION SCRB 2OZ 10% POVIDONE IOD ANTIMIC BTL

## (undated) DEVICE — KIT ADAP STERILE WATER 1000ML -- AIRLIFE

## (undated) DEVICE — SOLUTION LACTATED RINGERS INJECTION USP

## (undated) DEVICE — KENDALL DL ECG CABLE AND LEAD WIRE SYSTEM, 3-LEAD, SINGLE PATIENT USE: Brand: KENDALL

## (undated) DEVICE — GAUZE,SPONGE,2"X2",8PLY,STERILE,LF,2'S: Brand: MEDLINE

## (undated) DEVICE — CODMAN® SURGICAL PATTIES 1/2" X 3" (1.27CM X 7.62CM): Brand: CODMAN®

## (undated) DEVICE — SOLUTION IV 1000ML 0.9% SOD CHL

## (undated) DEVICE — AIRLIFE™ FACE TENT MASK VINYL: Brand: AIRLIFE™

## (undated) DEVICE — SYRINGE,EAR/ULCER, 2 OZ, STERILE: Brand: MEDLINE

## (undated) DEVICE — SYR 10ML CTRL LR LCK NSAF LF --

## (undated) DEVICE — BLADE OPHTH MINI BEAV SHRP --

## (undated) DEVICE — SUTURE CHROMIC GUT SZ 4-0 L18IN ABSRB BRN L13MM P-3 3/8 CIR 1654G

## (undated) DEVICE — BLADE 1882040 5PK INFERIOR TURB 2MM

## (undated) DEVICE — AIRLIFE™ CORRUGATED FLEXIBLE POLYETHYLENE AND EVA TUBING FOR AEROSOL AND IPPB USE, SEGMENTED, 6 FEET (1.8 M) LENGTH, 22 MM I.D.: Brand: AIRLIFE™

## (undated) DEVICE — NEEDLE HYPO 25GA L1.5IN BVL ORIENTED ECLIPSE

## (undated) DEVICE — BOWL UTIL GRAD 32OZ STRL --

## (undated) DEVICE — 1010 S-DRAPE TOWEL DRAPE 10/BX: Brand: STERI-DRAPE™

## (undated) DEVICE — SUTURE ABSORBABLE MONOFILAMENT 4-0 SC-1 18 IN PLN GUT 1824H